# Patient Record
Sex: FEMALE | Race: WHITE
[De-identification: names, ages, dates, MRNs, and addresses within clinical notes are randomized per-mention and may not be internally consistent; named-entity substitution may affect disease eponyms.]

---

## 2020-03-03 ENCOUNTER — HOSPITAL ENCOUNTER (OUTPATIENT)
Dept: HOSPITAL 47 - PROCWHC3 | Age: 75
Discharge: HOME | End: 2020-03-03
Attending: FAMILY MEDICINE
Payer: MEDICARE

## 2020-03-03 VITALS
RESPIRATION RATE: 16 BRPM | TEMPERATURE: 97.5 F | SYSTOLIC BLOOD PRESSURE: 149 MMHG | HEART RATE: 55 BPM | DIASTOLIC BLOOD PRESSURE: 80 MMHG

## 2020-03-03 DIAGNOSIS — M81.0: Primary | ICD-10-CM

## 2020-03-03 PROCEDURE — 96365 THER/PROPH/DIAG IV INF INIT: CPT

## 2020-08-27 ENCOUNTER — APPOINTMENT (OUTPATIENT)
Dept: URBAN - METROPOLITAN AREA CLINIC 234 | Age: 75
Setting detail: DERMATOLOGY
End: 2020-08-27

## 2020-08-27 VITALS — WEIGHT: 136 LBS | HEIGHT: 66 IN

## 2020-08-27 DIAGNOSIS — D485 NEOPLASM OF UNCERTAIN BEHAVIOR OF SKIN: ICD-10-CM

## 2020-08-27 DIAGNOSIS — L57.8 OTHER SKIN CHANGES DUE TO CHRONIC EXPOSURE TO NONIONIZING RADIATION: ICD-10-CM

## 2020-08-27 PROBLEM — D48.5 NEOPLASM OF UNCERTAIN BEHAVIOR OF SKIN: Status: ACTIVE | Noted: 2020-08-27

## 2020-08-27 PROCEDURE — 99201: CPT | Mod: 25

## 2020-08-27 PROCEDURE — 11102 TANGNTL BX SKIN SINGLE LES: CPT

## 2020-08-27 PROCEDURE — OTHER BIOPSY BY SHAVE METHOD: OTHER

## 2020-08-27 PROCEDURE — OTHER COUNSELING: OTHER

## 2020-08-27 PROCEDURE — A4550 SURGICAL TRAYS: HCPCS

## 2020-08-27 ASSESSMENT — LOCATION SIMPLE DESCRIPTION DERM
LOCATION SIMPLE: RIGHT UPPER ARM
LOCATION SIMPLE: RIGHT POSTERIOR UPPER ARM
LOCATION SIMPLE: LEFT UPPER BACK

## 2020-08-27 ASSESSMENT — LOCATION DETAILED DESCRIPTION DERM
LOCATION DETAILED: LEFT MID-UPPER BACK
LOCATION DETAILED: RIGHT ANTERIOR DISTAL UPPER ARM
LOCATION DETAILED: RIGHT PROXIMAL POSTERIOR UPPER ARM

## 2020-08-27 ASSESSMENT — LOCATION ZONE DERM
LOCATION ZONE: TRUNK
LOCATION ZONE: ARM

## 2020-08-27 NOTE — PROCEDURE: BIOPSY BY SHAVE METHOD
Bill For Surgical Tray: yes
Hide Topical Anesthesia?: No
Anesthesia Type: 1% lidocaine with epinephrine and a 1:10 solution of 8.4% sodium bicarbonate
Silver Nitrate Text: The wound bed was treated with silver nitrate after the biopsy was performed.
Notification Instructions: Patient will be notified of biopsy results. However, patient instructed to call the office if not contacted within 2 weeks.
Type Of Destruction Used: Curettage
Additional Anesthesia Volume In Cc (Will Not Render If 0): 0
Wound Care: Petrolatum
Biopsy Type: H and E
Size Of Lesion In Cm: 0.9
Electrodesiccation Text: The wound bed was treated with electrodesiccation after the biopsy was performed.
Information: Selecting Yes will display possible errors in your note based on the variables you have selected. This validation is only offered as a suggestion for you. PLEASE NOTE THAT THE VALIDATION TEXT WILL BE REMOVED WHEN YOU FINALIZE YOUR NOTE. IF YOU WANT TO FAX A PRELIMINARY NOTE YOU WILL NEED TO TOGGLE THIS TO 'NO' IF YOU DO NOT WANT IT IN YOUR FAXED NOTE.
Cryotherapy Text: The wound bed was treated with cryotherapy after the biopsy was performed.
Body Location Override (Optional - Billing Will Still Be Based On Selected Body Map Location If Applicable): left back
Hemostasis: Meggan's
Consent: Written consent was obtained and risks were reviewed including but not limited to scarring, infection, bleeding, scabbing, incomplete removal, nerve damage and allergy to anesthesia.
Biopsy Method: double edge Personna blade
Depth Of Biopsy: dermis
Electrodesiccation And Curettage Text: The wound bed was treated with electrodesiccation and curettage after the biopsy was performed.
Post-Care Instructions: I reviewed with the patient in detail post-care instructions. Patient is to keep the biopsy site dry overnight, and then apply bacitracin twice daily until healed. Patient may apply hydrogen peroxide soaks to remove any crusting.
Detail Level: Detailed
Billing Type: Third-Party Bill
Anesthesia Volume In Cc (Will Not Render If 0): 0.5
Curettage Text: The wound bed was treated with curettage after the biopsy was performed.
Dressing: bandage

## 2021-03-19 ENCOUNTER — HOSPITAL ENCOUNTER (OUTPATIENT)
Dept: HOSPITAL 47 - PROCWHC3 | Age: 76
End: 2021-03-19
Attending: FAMILY MEDICINE
Payer: MEDICARE

## 2021-03-19 VITALS
TEMPERATURE: 97.6 F | RESPIRATION RATE: 16 BRPM | HEART RATE: 52 BPM | SYSTOLIC BLOOD PRESSURE: 131 MMHG | DIASTOLIC BLOOD PRESSURE: 74 MMHG

## 2021-03-19 DIAGNOSIS — M81.0: Primary | ICD-10-CM

## 2021-03-19 PROCEDURE — 96365 THER/PROPH/DIAG IV INF INIT: CPT

## 2021-12-14 ENCOUNTER — HOSPITAL ENCOUNTER (OUTPATIENT)
Dept: HOSPITAL 47 - ORWHC2ENDO | Age: 76
Discharge: HOME | End: 2021-12-14
Attending: SURGERY
Payer: MEDICARE

## 2021-12-14 VITALS — RESPIRATION RATE: 18 BRPM | HEART RATE: 78 BPM | SYSTOLIC BLOOD PRESSURE: 117 MMHG | DIASTOLIC BLOOD PRESSURE: 56 MMHG

## 2021-12-14 VITALS — TEMPERATURE: 97.3 F

## 2021-12-14 VITALS — BODY MASS INDEX: 21.6 KG/M2

## 2021-12-14 DIAGNOSIS — M81.0: ICD-10-CM

## 2021-12-14 DIAGNOSIS — Z98.890: ICD-10-CM

## 2021-12-14 DIAGNOSIS — M19.90: ICD-10-CM

## 2021-12-14 DIAGNOSIS — Z86.010: ICD-10-CM

## 2021-12-14 DIAGNOSIS — Z96.652: ICD-10-CM

## 2021-12-14 DIAGNOSIS — Z79.82: ICD-10-CM

## 2021-12-14 DIAGNOSIS — Z80.8: ICD-10-CM

## 2021-12-14 DIAGNOSIS — Z79.899: ICD-10-CM

## 2021-12-14 DIAGNOSIS — I25.10: ICD-10-CM

## 2021-12-14 DIAGNOSIS — I25.2: ICD-10-CM

## 2021-12-14 DIAGNOSIS — D64.9: ICD-10-CM

## 2021-12-14 DIAGNOSIS — I10: ICD-10-CM

## 2021-12-14 DIAGNOSIS — Z12.11: Primary | ICD-10-CM

## 2021-12-14 DIAGNOSIS — E78.5: ICD-10-CM

## 2021-12-14 DIAGNOSIS — Z95.5: ICD-10-CM

## 2021-12-14 DIAGNOSIS — Z77.22: ICD-10-CM

## 2021-12-14 DIAGNOSIS — Z82.49: ICD-10-CM

## 2021-12-14 DIAGNOSIS — Z85.3: ICD-10-CM

## 2021-12-14 DIAGNOSIS — Z87.891: ICD-10-CM

## 2021-12-14 PROCEDURE — 45378 DIAGNOSTIC COLONOSCOPY: CPT

## 2021-12-23 ENCOUNTER — APPOINTMENT (OUTPATIENT)
Dept: URBAN - METROPOLITAN AREA CLINIC 234 | Age: 76
Setting detail: DERMATOLOGY
End: 2021-12-23

## 2021-12-23 VITALS — HEIGHT: 66 IN | WEIGHT: 138 LBS

## 2021-12-23 DIAGNOSIS — L82.1 OTHER SEBORRHEIC KERATOSIS: ICD-10-CM

## 2021-12-23 DIAGNOSIS — L57.8 OTHER SKIN CHANGES DUE TO CHRONIC EXPOSURE TO NONIONIZING RADIATION: ICD-10-CM

## 2021-12-23 DIAGNOSIS — D18.0 HEMANGIOMA: ICD-10-CM

## 2021-12-23 PROBLEM — D18.01 HEMANGIOMA OF SKIN AND SUBCUTANEOUS TISSUE: Status: ACTIVE | Noted: 2021-12-23

## 2021-12-23 PROCEDURE — OTHER COUNSELING: OTHER

## 2021-12-23 PROCEDURE — 99213 OFFICE O/P EST LOW 20 MIN: CPT

## 2021-12-23 ASSESSMENT — LOCATION DETAILED DESCRIPTION DERM
LOCATION DETAILED: LEFT ANTERIOR PROXIMAL THIGH
LOCATION DETAILED: PERIUMBILICAL SKIN
LOCATION DETAILED: RIGHT PROXIMAL DORSAL FOREARM
LOCATION DETAILED: LEFT PROXIMAL DORSAL FOREARM
LOCATION DETAILED: RIGHT MEDIAL UPPER BACK

## 2021-12-23 ASSESSMENT — LOCATION SIMPLE DESCRIPTION DERM
LOCATION SIMPLE: LEFT FOREARM
LOCATION SIMPLE: ABDOMEN
LOCATION SIMPLE: LEFT THIGH
LOCATION SIMPLE: RIGHT UPPER BACK
LOCATION SIMPLE: RIGHT FOREARM

## 2021-12-23 ASSESSMENT — LOCATION ZONE DERM
LOCATION ZONE: TRUNK
LOCATION ZONE: LEG
LOCATION ZONE: ARM

## 2022-06-17 ENCOUNTER — HOSPITAL ENCOUNTER (INPATIENT)
Dept: HOSPITAL 47 - EC | Age: 77
LOS: 5 days | Discharge: NURSING, REHAB OR LONG-TERM CARE | DRG: 482 | End: 2022-06-22
Attending: ORTHOPAEDIC SURGERY | Admitting: ORTHOPAEDIC SURGERY
Payer: MEDICARE

## 2022-06-17 DIAGNOSIS — I45.10: ICD-10-CM

## 2022-06-17 DIAGNOSIS — I25.10: ICD-10-CM

## 2022-06-17 DIAGNOSIS — Z79.82: ICD-10-CM

## 2022-06-17 DIAGNOSIS — S72.21XA: Primary | ICD-10-CM

## 2022-06-17 DIAGNOSIS — I44.0: ICD-10-CM

## 2022-06-17 DIAGNOSIS — Z96.652: ICD-10-CM

## 2022-06-17 DIAGNOSIS — Z82.3: ICD-10-CM
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
## 2022-06-17 DIAGNOSIS — I11.9: ICD-10-CM

## 2022-06-17 DIAGNOSIS — D72.829: ICD-10-CM

## 2022-06-17 DIAGNOSIS — Z79.899: ICD-10-CM

## 2022-06-17 DIAGNOSIS — M81.0: ICD-10-CM

## 2022-06-17 DIAGNOSIS — Z82.49: ICD-10-CM

## 2022-06-17 DIAGNOSIS — E78.5: ICD-10-CM

## 2022-06-17 DIAGNOSIS — Z85.3: ICD-10-CM

## 2022-06-17 DIAGNOSIS — Z80.8: ICD-10-CM

## 2022-06-17 DIAGNOSIS — Z86.74: ICD-10-CM

## 2022-06-17 DIAGNOSIS — Z08: ICD-10-CM

## 2022-06-17 DIAGNOSIS — I25.2: ICD-10-CM
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
## 2022-06-17 DIAGNOSIS — Z95.5: ICD-10-CM

## 2022-06-17 LAB
ALBUMIN SERPL-MCNC: 4.1 G/DL (ref 3.5–5)
ALP SERPL-CCNC: 87 U/L (ref 38–126)
ALT SERPL-CCNC: 29 U/L (ref 4–34)
ANION GAP SERPL CALC-SCNC: 6 MMOL/L
APTT BLD: 20.9 SEC (ref 22–30)
AST SERPL-CCNC: 46 U/L (ref 14–36)
BASOPHILS # BLD AUTO: 0.1 K/UL (ref 0–0.2)
BASOPHILS NFR BLD AUTO: 0 %
BUN SERPL-SCNC: 19 MG/DL (ref 7–17)
CALCIUM SPEC-MCNC: 8.7 MG/DL (ref 8.4–10.2)
CHLORIDE SERPL-SCNC: 105 MMOL/L (ref 98–107)
CO2 SERPL-SCNC: 24 MMOL/L (ref 22–30)
EOSINOPHIL # BLD AUTO: 0.2 K/UL (ref 0–0.7)
EOSINOPHIL NFR BLD AUTO: 1 %
ERYTHROCYTE [DISTWIDTH] IN BLOOD BY AUTOMATED COUNT: 3.88 M/UL (ref 3.8–5.4)
ERYTHROCYTE [DISTWIDTH] IN BLOOD: 11.7 % (ref 11.5–15.5)
GLUCOSE SERPL-MCNC: 158 MG/DL (ref 74–99)
HCT VFR BLD AUTO: 38.6 % (ref 34–46)
HGB BLD-MCNC: 13.1 GM/DL (ref 11.4–16)
INR PPP: 1 (ref ?–1.2)
LYMPHOCYTES # SPEC AUTO: 1.4 K/UL (ref 1–4.8)
LYMPHOCYTES NFR SPEC AUTO: 11 %
MCH RBC QN AUTO: 33.9 PG (ref 25–35)
MCHC RBC AUTO-ENTMCNC: 34 G/DL (ref 31–37)
MCV RBC AUTO: 99.7 FL (ref 80–100)
MONOCYTES # BLD AUTO: 0.6 K/UL (ref 0–1)
MONOCYTES NFR BLD AUTO: 4 %
NEUTROPHILS # BLD AUTO: 10.6 K/UL (ref 1.3–7.7)
NEUTROPHILS NFR BLD AUTO: 82 %
PH UR: 7.5 [PH] (ref 5–8)
PLATELET # BLD AUTO: 266 K/UL (ref 150–450)
POTASSIUM SERPL-SCNC: 3.9 MMOL/L (ref 3.5–5.1)
PROT SERPL-MCNC: 6.7 G/DL (ref 6.3–8.2)
PT BLD: 10.7 SEC (ref 9–12)
SODIUM SERPL-SCNC: 135 MMOL/L (ref 137–145)
SP GR UR: 1.01 (ref 1–1.03)
UROBILINOGEN UR QL STRIP: <2 MG/DL (ref ?–2)
WBC # BLD AUTO: 13 K/UL (ref 3.8–10.6)

## 2022-06-17 PROCEDURE — 80320 DRUG SCREEN QUANTALCOHOLS: CPT

## 2022-06-17 PROCEDURE — 87635 SARS-COV-2 COVID-19 AMP PRB: CPT

## 2022-06-17 PROCEDURE — 36415 COLL VENOUS BLD VENIPUNCTURE: CPT

## 2022-06-17 PROCEDURE — 80306 DRUG TEST PRSMV INSTRMNT: CPT

## 2022-06-17 PROCEDURE — 85610 PROTHROMBIN TIME: CPT

## 2022-06-17 PROCEDURE — 93306 TTE W/DOPPLER COMPLETE: CPT

## 2022-06-17 PROCEDURE — 96374 THER/PROPH/DIAG INJ IV PUSH: CPT

## 2022-06-17 PROCEDURE — 73502 X-RAY EXAM HIP UNI 2-3 VIEWS: CPT

## 2022-06-17 PROCEDURE — 73501 X-RAY EXAM HIP UNI 1 VIEW: CPT

## 2022-06-17 PROCEDURE — 71045 X-RAY EXAM CHEST 1 VIEW: CPT

## 2022-06-17 PROCEDURE — 81003 URINALYSIS AUTO W/O SCOPE: CPT

## 2022-06-17 PROCEDURE — 86850 RBC ANTIBODY SCREEN: CPT

## 2022-06-17 PROCEDURE — 85025 COMPLETE CBC W/AUTO DIFF WBC: CPT

## 2022-06-17 PROCEDURE — 86900 BLOOD TYPING SEROLOGIC ABO: CPT

## 2022-06-17 PROCEDURE — 80048 BASIC METABOLIC PNL TOTAL CA: CPT

## 2022-06-17 PROCEDURE — 96376 TX/PRO/DX INJ SAME DRUG ADON: CPT

## 2022-06-17 PROCEDURE — 86901 BLOOD TYPING SEROLOGIC RH(D): CPT

## 2022-06-17 PROCEDURE — 80053 COMPREHEN METABOLIC PANEL: CPT

## 2022-06-17 PROCEDURE — 99285 EMERGENCY DEPT VISIT HI MDM: CPT

## 2022-06-17 PROCEDURE — 85730 THROMBOPLASTIN TIME PARTIAL: CPT

## 2022-06-17 RX ADMIN — HYDROMORPHONE HYDROCHLORIDE PRN MG: 1 INJECTION, SOLUTION INTRAMUSCULAR; INTRAVENOUS; SUBCUTANEOUS at 18:03

## 2022-06-17 RX ADMIN — CEFAZOLIN SCH: 330 INJECTION, POWDER, FOR SOLUTION INTRAMUSCULAR; INTRAVENOUS at 22:50

## 2022-06-17 RX ADMIN — HYDROMORPHONE HYDROCHLORIDE PRN MG: 1 INJECTION, SOLUTION INTRAMUSCULAR; INTRAVENOUS; SUBCUTANEOUS at 20:58

## 2022-06-17 RX ADMIN — CHOLECALCIFEROL TAB 125 MCG (5000 UNIT) SCH MCG: 125 TAB at 22:51

## 2022-06-17 RX ADMIN — METOPROLOL SUCCINATE SCH MG: 25 TABLET, EXTENDED RELEASE ORAL at 20:58

## 2022-06-17 RX ADMIN — ATORVASTATIN CALCIUM SCH MG: 40 TABLET, FILM COATED ORAL at 20:58

## 2022-06-17 RX ADMIN — CYCLOBENZAPRINE HYDROCHLORIDE SCH MG: 10 TABLET, FILM COATED ORAL at 16:29

## 2022-06-17 RX ADMIN — CYCLOBENZAPRINE HYDROCHLORIDE SCH MG: 10 TABLET, FILM COATED ORAL at 22:51

## 2022-06-17 NOTE — ED
General Adult HPI





- General


Chief complaint: Fall


Stated complaint: Femur fracture


Time Seen by Provider: 06/17/22 15:16


Source: patient, RN notes reviewed, old records reviewed


Mode of arrival: EMS


Limitations: no limitations





- History of Present Illness


Initial comments: 





Patient is a 77-year-old female with past medical history remarkable for 

hypertension, hyperlipidemia, alcohol use with no history of withdrawals who 

presents emergency Department following a fall at home.  Patient states she was 

cleaning out her basement when she fell and landed on her right side on the 

concrete floor.  Instantly had pain in her thigh.  Wasn't able to ambulate.  EMS

is concerned regarding off femur fracture.  Patient has intact neurovascularly 

in the right lower extremity.  Denies any other injuries.  Denies any head 

injury, loss of consciousness, being on blood thinners.  Denies any cervical, 

thoracic, lumbar spine tenderness to palpation.  His no abdominal or chest pain.

 No other acute complaints at this time.  States she it was mechanical fall, 

losing her balance and falling.  Denies syncopal episodes or lightheadedness 

prior to fall.  His no other acute complaints at this time.





- Related Data


                                Home Medications











 Medication  Instructions  Recorded  Confirmed


 


Melatonin 10 mg PO HS 12/09/14 03/21/22


 


Turmeric 475 mg PO DAILY 12/09/14 03/21/22


 


Aspirin [Adult Low Dose Aspirin EC] 81 mg PO DAILY 03/03/20 03/21/22


 


Losartan Potassium 100 mg PO DAILY 03/03/20 03/21/22


 


Metoprolol Succinate (ER) [Toprol 25 mg PO 2100 03/03/20 03/21/22





XL]   


 


hydroCHLOROthiazide 25 mg PO DAILY 03/03/20 03/21/22


 


Atorvastatin [Lipitor] 40 mg PO 2100 12/10/21 03/21/22


 


Calcium/Magnesium/Zinc 2 each PO DAILY 12/10/21 03/21/22





[Calcium-Magnesium-Zinc Tablet]   


 


Cholecalciferol [Vitamin D3 (125 125 mcg PO DAILY 12/10/21 03/21/22





Mcg = 5000 Iu)]   


 


Reclast Infusion 1 applicate IV AS DIRECTED 12/10/21 03/21/22


 


amLODIPine [Norvasc] 5 mg PO 2100 12/10/21 03/21/22











                                    Allergies











Allergy/AdvReac Type Severity Reaction Status Date / Time


 


No Known Allergies Allergy   Verified 03/21/22 12:07














Review of Systems


ROS Statement: 


Those systems with pertinent positive or pertinent negative responses have been 

documented in the HPI.


Review of Systems:


CONST: Denies fever 


EYES: Denies blurry vision 


ENT: Denies nasal congestion  


C/V:  Denies Chest pain


RESP: Denies shortness of breath 


GI: Denies abdominal pain 


: Denies dysuria  


SKIN: Denies rash.


MSK: Endorses right thigh pain.


NEURO: Denies headache 


ROS Other: All systems not noted in ROS Statement are negative.





Past Medical History


Past Medical History: Coronary Artery Disease (CAD), Cancer, Hyperlipidemia, 

Hypertension, Myocardial Infarction (MI), Osteoarthritis (OA)


Additional Past Medical History / Comment(s): BREAST CANCER, OSTEOPOROSIS, hx 

anemia


Last Myocardial Infarction Date:: 08/25/2019


History of Any Multi-Drug Resistant Organisms: None Reported


Past Surgical History: Breast Surgery, Heart Catheterization With Stent, 

Orthopedic Surgery


Additional Past Surgical History / Comment(s): LT BREAST LUMPECTOMY, LT KNEE 

arthroscopy, 2 cardiac stents, left knee replacement


Past Anesthesia/Blood Transfusion Reactions: Previous Problems w/ Anesthesia, 

Motion Sickness, Postoperative Nausea & Vomiting (PONV)


Additional Past Anesthesia/Blood Transfusion Reaction / Comment(s): HAD SEVERE 

PAIN AND BRUISING ON LEFT LOWER BACK AREA AFTER LT KNEE SCOPE


Date of Last Stent Placement:: 08/25/2019


Past Psychological History: No Psychological Hx Reported


Smoking Status: Never smoker


Past Alcohol Use History: Daily


Past Drug Use History: None Reported





- Past Family History


  ** Father


Family Medical History: Myocardial Infarction (MI)





  ** Mother


Family Medical History: CVA/TIA





  ** Daughter(s)


Additional Family Medical History / Comment(s): melanoma





General Exam





- General Exam Comments


Initial Comments: 





General: Appears in moderate distress secondary to right leg pain.


HEAD:  Normal with no signs of head trauma.


EYES:  PERRLA, EOMI, conjunctiva normal, no discharge.  Pupils are 3 mm and 

equal bilaterally.


ENT:  Hearing grossly intact, normal oropharynx.


RESPIRATORY:  Clear breath sounds bilaterally.  No wheezes, rales, or rhonchi.  


C/V:  Regular rate and rhythm. S1 and S2 auscultated, no edema, peripheral p

ulses 2+ and intact throughout


ABD:  Abd is soft, nontender, nondistended


EXT: Reduced range of motion of the right lower extremity with somewhat obvious 

deformity to the right thigh.  Right lower extremity is shortened.  Minimally 

externally rotated.  Neurovascularly intact in the right lower extremity.  

Pelvis is stable.  No midline C, T spine, L spine tenderness to palpation.


SKIN: Patient has resolving poison ivy located over the  bilateral arms.


NEURO: Alert and oriented 4.  No focal sensory strength deficits other than 

right lower extremity secondary to suspected fracture.  No sensory deficits 

distal to the fracture site.


Limitations: no limitations





Course





                                   Vital Signs











  06/17/22





  15:16


 


Temperature 97.3 F L


 


Pulse Rate 61


 


Respiratory 16





Rate 


 


Blood Pressure 158/84


 


O2 Sat by Pulse 98





Oximetry 














Medical Decision Making





- Medical Decision Making





Based on the patient's presentation and physical exam, I'm concerned for a right

 femur fracture.  She is no other signs of injury.  Therefore we will obtain 

basic surgical labs, x-rays of the right femur, pelvis and hip, as well as 

chest.  She was in agreement this plan.  Vital signs otherwise within normal 

limits and stable.  She is neurovascular intact distal to the fracture site.  It

 is not a open fracture.  Patient will be Mr. IV fluids as well as Dilaudid for 

pain.





Laboratory studies are remarkable for mild leukocytosis of 13.  Opiate screen is

 positive after she received the Dilaudid.  Alcohol level is negative.  

Remainder the labs are unremarkable.  Chest x-ray, hip x-ray, pelvis x-ray, 

femur x-rays revealed a comminuted oblique fracture of the femur proximal shaft 

and subtrochanteric region.  There is also displaced fracture fragment of the 

lesser trochanteric region.





After the patient.  Blood catheter was placed.  Patient's leg is stabilized, and

 remains neurovascularly intact distally, with good DP and TP pulses.  I spoke 

with the admitting doctor, Evaristo in orthopedic surgery who requested the 

patient be made nothing by mouth after midnight.  Flexeril and pain medications 

will be continued.  He requested medicine be consulted for medical management.  

We discussed reduction verses casting versus immobilizer, and he states that as 

long as the leg is stable with intact neurovascular exam, it can remain as it 

is.  Recommended the patient remained nonambulatory.  I discussed this with the 

patient she was in agreement this plan.  I consulted Dr. De who agreed to 

medically manage the patient.  She was restarted on her home medications.  Plan 

is surgery tomorrow.  NPO after midnight. She was admitted in stable condition.





- Lab Data


Result diagrams: 


                                 06/17/22 15:59





                                 06/17/22 15:59





                                   Lab Results











  06/17/22 06/17/22 06/17/22 Range/Units





  15:59 15:59 15:59 


 


WBC  13.0 H    (3.8-10.6)  k/uL


 


RBC  3.88    (3.80-5.40)  m/uL


 


Hgb  13.1    (11.4-16.0)  gm/dL


 


Hct  38.6    (34.0-46.0)  %


 


MCV  99.7    (80.0-100.0)  fL


 


MCH  33.9    (25.0-35.0)  pg


 


MCHC  34.0    (31.0-37.0)  g/dL


 


RDW  11.7    (11.5-15.5)  %


 


Plt Count  266    (150-450)  k/uL


 


MPV  7.5    


 


Neutrophils %  82    %


 


Lymphocytes %  11    %


 


Monocytes %  4    %


 


Eosinophils %  1    %


 


Basophils %  0    %


 


Neutrophils #  10.6 H    (1.3-7.7)  k/uL


 


Lymphocytes #  1.4    (1.0-4.8)  k/uL


 


Monocytes #  0.6    (0-1.0)  k/uL


 


Eosinophils #  0.2    (0-0.7)  k/uL


 


Basophils #  0.1    (0-0.2)  k/uL


 


PT   10.7   (9.0-12.0)  sec


 


INR   1.0   (<1.2)  


 


APTT   20.9 L   (22.0-30.0)  sec


 


Sodium    135 L  (137-145)  mmol/L


 


Potassium    3.9  (3.5-5.1)  mmol/L


 


Chloride    105  ()  mmol/L


 


Carbon Dioxide    24  (22-30)  mmol/L


 


Anion Gap    6  mmol/L


 


BUN    19 H  (7-17)  mg/dL


 


Creatinine    0.74  (0.52-1.04)  mg/dL


 


Est GFR (CKD-EPI)AfAm    >90  (>60 ml/min/1.73 sqM)  


 


Est GFR (CKD-EPI)NonAf    79  (>60 ml/min/1.73 sqM)  


 


Glucose    158 H  (74-99)  mg/dL


 


Calcium    8.7  (8.4-10.2)  mg/dL


 


Total Bilirubin    0.8  (0.2-1.3)  mg/dL


 


AST    46 H  (14-36)  U/L


 


ALT    29  (4-34)  U/L


 


Alkaline Phosphatase    87  ()  U/L


 


Total Protein    6.7  (6.3-8.2)  g/dL


 


Albumin    4.1  (3.5-5.0)  g/dL


 


Urine Color     


 


Urine Appearance     (Clear)  


 


Urine pH     (5.0-8.0)  


 


Ur Specific Gravity     (1.001-1.035)  


 


Urine Protein     (Negative)  


 


Urine Glucose (UA)     (Negative)  


 


Urine Ketones     (Negative)  


 


Urine Blood     (Negative)  


 


Urine Nitrite     (Negative)  


 


Urine Bilirubin     (Negative)  


 


Urine Urobilinogen     (<2.0)  mg/dL


 


Ur Leukocyte Esterase     (Negative)  


 


Urine Opiates Screen     (NotDetected)  


 


Ur Oxycodone Screen     (NotDetected)  


 


Urine Methadone Screen     (NotDetected)  


 


Ur Propoxyphene Screen     (NotDetected)  


 


Ur Barbiturates Screen     (NotDetected)  


 


U Tricyclic Antidepress     (NotDetected)  


 


Ur Phencyclidine Scrn     (NotDetected)  


 


Ur Amphetamines Screen     (NotDetected)  


 


U Methamphetamines Scrn     (NotDetected)  


 


U Benzodiazepines Scrn     (NotDetected)  


 


Urine Cocaine Screen     (NotDetected)  


 


U Marijuana (THC) Screen     (NotDetected)  


 


Serum Alcohol    <10  mg/dL


 


Blood Type     


 


Blood Type Recheck     


 


Bld Type Recheck Status     


 


Antibody Screen     


 


Spec Expiration Date     














  06/17/22 06/17/22 Range/Units





  15:59 16:29 


 


WBC    (3.8-10.6)  k/uL


 


RBC    (3.80-5.40)  m/uL


 


Hgb    (11.4-16.0)  gm/dL


 


Hct    (34.0-46.0)  %


 


MCV    (80.0-100.0)  fL


 


MCH    (25.0-35.0)  pg


 


MCHC    (31.0-37.0)  g/dL


 


RDW    (11.5-15.5)  %


 


Plt Count    (150-450)  k/uL


 


MPV    


 


Neutrophils %    %


 


Lymphocytes %    %


 


Monocytes %    %


 


Eosinophils %    %


 


Basophils %    %


 


Neutrophils #    (1.3-7.7)  k/uL


 


Lymphocytes #    (1.0-4.8)  k/uL


 


Monocytes #    (0-1.0)  k/uL


 


Eosinophils #    (0-0.7)  k/uL


 


Basophils #    (0-0.2)  k/uL


 


PT    (9.0-12.0)  sec


 


INR    (<1.2)  


 


APTT    (22.0-30.0)  sec


 


Sodium    (137-145)  mmol/L


 


Potassium    (3.5-5.1)  mmol/L


 


Chloride    ()  mmol/L


 


Carbon Dioxide    (22-30)  mmol/L


 


Anion Gap    mmol/L


 


BUN    (7-17)  mg/dL


 


Creatinine    (0.52-1.04)  mg/dL


 


Est GFR (CKD-EPI)AfAm    (>60 ml/min/1.73 sqM)  


 


Est GFR (CKD-EPI)NonAf    (>60 ml/min/1.73 sqM)  


 


Glucose    (74-99)  mg/dL


 


Calcium    (8.4-10.2)  mg/dL


 


Total Bilirubin    (0.2-1.3)  mg/dL


 


AST    (14-36)  U/L


 


ALT    (4-34)  U/L


 


Alkaline Phosphatase    ()  U/L


 


Total Protein    (6.3-8.2)  g/dL


 


Albumin    (3.5-5.0)  g/dL


 


Urine Color   Yellow  


 


Urine Appearance   Clear  (Clear)  


 


Urine pH   7.5  (5.0-8.0)  


 


Ur Specific Gravity   1.015  (1.001-1.035)  


 


Urine Protein   Negative  (Negative)  


 


Urine Glucose (UA)   Negative  (Negative)  


 


Urine Ketones   Negative  (Negative)  


 


Urine Blood   Negative  (Negative)  


 


Urine Nitrite   Negative  (Negative)  


 


Urine Bilirubin   Negative  (Negative)  


 


Urine Urobilinogen   <2.0  (<2.0)  mg/dL


 


Ur Leukocyte Esterase   Negative  (Negative)  


 


Urine Opiates Screen   Detected H  (NotDetected)  


 


Ur Oxycodone Screen   Not Detected  (NotDetected)  


 


Urine Methadone Screen   Not Detected  (NotDetected)  


 


Ur Propoxyphene Screen   Not Detected  (NotDetected)  


 


Ur Barbiturates Screen   Not Detected  (NotDetected)  


 


U Tricyclic Antidepress   Not Detected  (NotDetected)  


 


Ur Phencyclidine Scrn   Not Detected  (NotDetected)  


 


Ur Amphetamines Screen   Not Detected  (NotDetected)  


 


U Methamphetamines Scrn   Not Detected  (NotDetected)  


 


U Benzodiazepines Scrn   Not Detected  (NotDetected)  


 


Urine Cocaine Screen   Not Detected  (NotDetected)  


 


U Marijuana (THC) Screen   Not Detected  (NotDetected)  


 


Serum Alcohol    mg/dL


 


Blood Type  O Positive   


 


Blood Type Recheck  No Previous Record   


 


Bld Type Recheck Status  CABO Indicated   


 


Antibody Screen  NEGATIVE   


 


Spec Expiration Date  06/20/2022 - 2359   














Disposition


Clinical Impression: 


 Fall, Femur fracture, right





Disposition: ADMITTED AS IP TO THIS HOSP


Condition: Stable


Referrals: 


Paris Ramirez MD [Primary Care Provider] - 1-2 days


Time of Disposition: 16:18

## 2022-06-17 NOTE — XR
EXAMINATION TYPE: XR chest 1V portable

 

DATE OF EXAM: 6/17/2022

 

Comparison: None

 

Clinical History: 77-year-old female pain after falling, trauma

 

Findings:

Heart mildly enlarged. Mild atherosclerotic arch calcifications. Mild interstitial prominence has a c
hronic appearance. Surgical clips left axilla. No marcel consolidation or pleural effusion.

 

 

Impression:

Mild cardiomegaly. Chronic appearing changes in the lungs. No definite acute process.

## 2022-06-17 NOTE — P.PN
Progress Note - Text


Progress Note Date: 06/17/22





Spoke with ED about pt.  Imaging reviewed.  She will be admitted to Orthopedic 

service with medicine consult for clearance.  We plan on OR tomorrow AM for ORIF

with IMN fixation.

## 2022-06-17 NOTE — XR
EXAMINATION TYPE: XR 2 views Hip RT and AP Pelvis, XR femur 2 views RT

 

DATE OF EXAM: 6/17/2022

 

COMPARISON: NONE

 

HISTORY: 77-year-old female trauma, pain following

 

 

FINDINGS: 

 

Pelvis and right hip:

SI joints appear symmetric and intact. Pubic symphysis appears intact. Left hip appears intact. Minim
al marginal spurring at both hips. Degenerative changes lower lumbar spine.

 

Right femur:

There is a comminuted fracture of the proximal shaft and subtrochanteric region of the right femur. T
here is a displaced fracture fragment comprised of the lesser trochanter and a 14.1 cm long butterfly
 fragment. There is one shaft's width of medial displacement along with posterior apex angulation. Th
ere is degenerative spurring is noted at the knee.

 

 

 

IMPRESSION:  

1. Right femur: Comminuted oblique fracture proximal shaft and subtrochanteric region of the right fe
mur. Displaced fracture fragment is comprised of the lesser trochanter and there is also a 14.1 cm lo
ng butterfly fragment. One shaft's width of medial displacement along with posterior apex angulation.


2. Pelvis and right hip: Minimal early degenerative spurring of both hips. Degenerative changes lower
 lumbar spine.

## 2022-06-18 LAB
ANION GAP SERPL CALC-SCNC: 10.2 MMOL/L (ref 10–18)
BASOPHILS # BLD AUTO: 0.03 X 10*3/UL (ref 0–0.1)
BASOPHILS NFR BLD AUTO: 0.4 %
BUN SERPL-SCNC: 14.9 MG/DL (ref 9–27)
BUN/CREAT SERPL: 21.29 RATIO (ref 12–20)
CALCIUM SPEC-MCNC: 8 MG/DL (ref 8.7–10.3)
CHLORIDE SERPL-SCNC: 104 MMOL/L (ref 96–109)
CO2 SERPL-SCNC: 22.8 MMOL/L (ref 20–27.5)
EOSINOPHIL # BLD AUTO: 0.08 X 10*3/UL (ref 0.04–0.35)
EOSINOPHIL NFR BLD AUTO: 1.1 %
ERYTHROCYTE [DISTWIDTH] IN BLOOD BY AUTOMATED COUNT: 3.3 X 10*6/UL (ref 4.1–5.2)
ERYTHROCYTE [DISTWIDTH] IN BLOOD: 11.9 % (ref 11.5–14.5)
GLUCOSE SERPL-MCNC: 141 MG/DL (ref 70–110)
HCT VFR BLD AUTO: 32.6 % (ref 37.2–46.3)
HGB BLD-MCNC: 11 G/DL (ref 12–15)
IMM GRANULOCYTES BLD QL AUTO: 0.1 %
LYMPHOCYTES # SPEC AUTO: 1.65 X 10*3/UL (ref 0.9–5)
LYMPHOCYTES NFR SPEC AUTO: 22.5 %
MCH RBC QN AUTO: 33.3 PG (ref 27–32)
MCHC RBC AUTO-ENTMCNC: 33.7 G/DL (ref 32–37)
MCV RBC AUTO: 98.8 FL (ref 80–97)
MONOCYTES # BLD AUTO: 0.85 X 10*3/UL (ref 0.2–1)
MONOCYTES NFR BLD AUTO: 11.6 %
NEUTROPHILS # BLD AUTO: 4.71 X 10*3/UL (ref 1.8–7.7)
NEUTROPHILS NFR BLD AUTO: 64.3 %
NRBC BLD AUTO-RTO: 0 /100 WBCS (ref 0–0)
PLATELET # BLD AUTO: 217 X 10*3/UL (ref 140–440)
POTASSIUM SERPL-SCNC: 3.5 MMOL/L (ref 3.5–5.5)
SODIUM SERPL-SCNC: 137 MMOL/L (ref 135–145)
WBC # BLD AUTO: 7.33 X 10*3/UL (ref 4.5–10)

## 2022-06-18 RX ADMIN — CYCLOBENZAPRINE HYDROCHLORIDE SCH MG: 10 TABLET, FILM COATED ORAL at 21:40

## 2022-06-18 RX ADMIN — CHOLECALCIFEROL TAB 125 MCG (5000 UNIT) SCH MCG: 125 TAB at 22:05

## 2022-06-18 RX ADMIN — HYDROMORPHONE HYDROCHLORIDE PRN MG: 1 INJECTION, SOLUTION INTRAMUSCULAR; INTRAVENOUS; SUBCUTANEOUS at 00:18

## 2022-06-18 RX ADMIN — HYDROMORPHONE HYDROCHLORIDE PRN MG: 1 INJECTION, SOLUTION INTRAMUSCULAR; INTRAVENOUS; SUBCUTANEOUS at 18:06

## 2022-06-18 RX ADMIN — LOSARTAN POTASSIUM SCH MG: 50 TABLET, FILM COATED ORAL at 08:21

## 2022-06-18 RX ADMIN — Medication PRN MG: at 21:40

## 2022-06-18 RX ADMIN — ATORVASTATIN CALCIUM SCH MG: 40 TABLET, FILM COATED ORAL at 22:05

## 2022-06-18 RX ADMIN — HYDROMORPHONE HYDROCHLORIDE PRN MG: 1 INJECTION, SOLUTION INTRAMUSCULAR; INTRAVENOUS; SUBCUTANEOUS at 13:29

## 2022-06-18 RX ADMIN — CEFAZOLIN SCH MLS/HR: 330 INJECTION, POWDER, FOR SOLUTION INTRAMUSCULAR; INTRAVENOUS at 07:45

## 2022-06-18 RX ADMIN — HYDROMORPHONE HYDROCHLORIDE PRN MG: 1 INJECTION, SOLUTION INTRAMUSCULAR; INTRAVENOUS; SUBCUTANEOUS at 21:40

## 2022-06-18 RX ADMIN — CEFAZOLIN SCH MLS/HR: 330 INJECTION, POWDER, FOR SOLUTION INTRAMUSCULAR; INTRAVENOUS at 16:42

## 2022-06-18 RX ADMIN — ASPIRIN 81 MG CHEWABLE TABLET SCH MG: 81 TABLET CHEWABLE at 08:22

## 2022-06-18 RX ADMIN — HYDROMORPHONE HYDROCHLORIDE PRN MG: 1 INJECTION, SOLUTION INTRAMUSCULAR; INTRAVENOUS; SUBCUTANEOUS at 05:16

## 2022-06-18 RX ADMIN — CYCLOBENZAPRINE HYDROCHLORIDE SCH MG: 10 TABLET, FILM COATED ORAL at 08:22

## 2022-06-18 RX ADMIN — HYDROMORPHONE HYDROCHLORIDE PRN MG: 1 INJECTION, SOLUTION INTRAMUSCULAR; INTRAVENOUS; SUBCUTANEOUS at 08:20

## 2022-06-18 RX ADMIN — METOPROLOL SUCCINATE SCH MG: 25 TABLET, EXTENDED RELEASE ORAL at 22:05

## 2022-06-18 NOTE — P.CONS
History of Present Illness





- Reason for Consult


Consult date: 06/18/22





- History of Present Illness








Amanda Pruitt,  is a 77-year-old female who presented to [facility] emergency room after sustaining a fall at home, patient stated that she

fell in her basement on the concrete floor and was having pain in the right 

thigh area she was unable to ambulate, she denies any other area of pain , she 

denies any head trauma when she fell there was no loss of consciousness , EMS 

were called and she was brought into emergency room.


She was evaluated in the emergency room vital examination on presentation 

revealed a temperature of 97.3 pulse 61 respirations 16 blood pressure 158/84 

pulse ox 98% on room air


Laboratory data revealed a white blood count of 13.0 hemoglobin 13.1 platelet 

count 266 BUN 19 creatinine 0.74 urine analysis did not reveal any evidence of 

urinary tract infection


Testing in the emergency room revealed x-ray of the right femur revealed 

comminuted oblique fracture in the proximal shaft and subtrochanteric region of 

the right femur, chest x-ray revealed marked cardiomegaly no acute process.  EKG

was done in the emergency room and revealed sinus rhythm with first-degree AV 

block and right bundle branch block and T-wave abnormality in the inferior leads


Patient was admitted to medical floor for further evaluation and treatment.


Past medical history is significant for history of hypertension, history of 

hyperlipidemia, history of coronary artery disease with history of myocardial 

infarction with cardiac arrest in 2019, patient had angioplasty with 2 stent 

placement at that time in [place], she states that she has been following with 

cardiology since then and had no further cardiac issues.  Past medical history 

is also significant for history of breast cancer, with history of left breast 

lumpectomy, and history of osteoarthritis with history of left total knee 

arthroplasty.








Past Medical History


Past Medical History: Coronary Artery Disease (CAD), Cancer, Hyperlipidemia, 

Hypertension, Myocardial Infarction (MI), Osteoarthritis (OA)


Additional Past Medical History / Comment(s): BREAST CANCER, OSTEOPOROSIS, hx 

anemia, cardiac arrest in 2019 with MI


Last Myocardial Infarction Date:: 08/25/2019


History of Any Multi-Drug Resistant Organisms: None Reported


Past Surgical History: Breast Surgery, Heart Catheterization With Stent, 

Orthopedic Surgery


Additional Past Surgical History / Comment(s): LT BREAST LUMPECTOMY, LT KNEE 

arthroscopy, 2 cardiac stents, left knee replacement


Past Anesthesia/Blood Transfusion Reactions: Previous Problems w/ Anesthesia, 

Motion Sickness, Postoperative Nausea & Vomiting (PONV)


Additional Past Anesthesia/Blood Transfusion Reaction / Comm: HAD SEVERE PAIN 

AND BRUISING ON LEFT LOWER BACK AREA AFTER LT KNEE SCOPE


Date of Last Stent Placement:: 08/25/2019


Past Psychological History: No Psychological Hx Reported


Smoking Status: Never smoker


Past Alcohol Use History: Daily


Additional Past Alcohol Use History / Comment(s): SMOKED VERY LITTLE IN 20'S, 

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 small glass red 

wine daily


Past Drug Use History: None Reported





- Past Family History


  ** Father


Family Medical History: Myocardial Infarction (MI)





  ** Mother


Family Medical History: CVA/TIA





  ** Daughter(s)


Additional Family Medical History / Comment(s): melanoma





Medications and Allergies


                                Home Medications











 Medication  Instructions  Recorded  Confirmed  Type


 


Melatonin 10 mg PO HS PRN 12/09/14 06/17/22 History


 


Aspirin [Adult Low Dose Aspirin EC] 81 mg PO DAILY 03/03/20 06/17/22 History


 


Losartan Potassium 100 mg PO DAILY 03/03/20 06/17/22 History


 


Metoprolol Succinate (ER) [Toprol 25 mg PO HS 03/03/20 06/17/22 History





XL]    


 


hydroCHLOROthiazide 25 mg PO DAILY 03/03/20 06/17/22 History


 


Atorvastatin [Lipitor] 40 mg PO HS 12/10/21 06/17/22 History


 


Calcium/Magnesium/Zinc 2 tab PO DAILY 12/10/21 06/17/22 History





[Calcium-Magnesium-Zinc Tablet]    


 


Cholecalciferol [Vitamin D3 (125 125 mcg PO HS 12/10/21 06/17/22 History





Mcg = 5000 Iu)]    


 


amLODIPine [Norvasc] 5 mg PO HS 12/10/21 06/17/22 History


 


Turmeric Root Extract [Turmeric] 500 mg PO DAILY 06/17/22 06/17/22 History


 


diphenhydrAMINE [Benadryl] 50 mg PO Q6H PRN 06/17/22 06/17/22 History








                                    Allergies











Allergy/AdvReac Type Severity Reaction Status Date / Time


 


No Known Allergies Allergy   Verified 06/17/22 17:22














Physical Exam


Vitals: 


                                   Vital Signs











  Temp Pulse Pulse Resp BP BP Pulse Ox


 


 06/18/22 07:52  98.0 F   68  20   147/60  92 L


 


 06/18/22 02:43  98.8 F   66  16   127/66  93 L


 


 06/17/22 21:09  98.1 F   59 L  16   125/88  94 L


 


 06/17/22 18:09  97.7 F  59 L   18  121/43   95


 


 06/17/22 15:16  97.3 F L  61   16  158/84   98








                                Intake and Output











 06/17/22 06/18/22 06/18/22





 22:59 06:59 14:59


 


Intake Total  700 


 


Output Total  900 


 


Balance  -200 


 


Intake:   


 


  Intake, IV Titration  700 





  Amount   


 


    Sodium Chloride 0.9% 1,  700 





    000 ml @ 100 mls/hr IV .   





    Q10H [facility] Rx#:[id number]   


 


Output:   


 


  Urine  900 


 


Other:   


 


  Voiding Method Indwelling Catheter  


 


  Weight 63.503 kg  

















In general patient is alert and oriented x 3 in no distress


HEENT head normocephalic and atraumatic


Neck is supple no JVD no goiter no lymphadenopathy no carotid bruit


Chest examination is clear to auscultation no crackles no wheezing


Cardiac exam reveals regular heart sounds S1 and S2 no gallops no murmurs


Abdomen is soft nontender no organomegaly with normal bowel sounds


Extremity exam reveals no edema no cyanosis or clubbing


Neurological examination reveals no gross focal deficits





Results


CBC & Chem 7: 


                                 06/18/22 05:21





                                 06/18/22 05:21


Labs: 


                  Abnormal Lab Results - Last 24 Hours (Table)











  06/17/22 06/17/22 06/17/22 Range/Units





  15:59 15:59 15:59 


 


WBC  13.0 H    (3.8-10.6)  k/uL


 


RBC     (4.10-5.20)  X 10*6/uL


 


Hgb     (12.0-15.0)  g/dL


 


Hct     (37.2-46.3)  %


 


MCV     (80.0-97.0)  fL


 


MCH     (27.0-32.0)  pg


 


Neutrophils #  10.6 H    (1.3-7.7)  k/uL


 


APTT   20.9 L   (22.0-30.0)  sec


 


Sodium    135 L  (137-145)  mmol/L


 


BUN    19 H  (7-17)  mg/dL


 


BUN/Creatinine Ratio     (12.00-20.00)  Ratio


 


Glucose    158 H  (74-99)  mg/dL


 


Calcium     (8.7-10.3)  mg/dL


 


AST    46 H  (14-36)  U/L


 


Urine Opiates Screen     (NotDetected)  














  06/17/22 06/18/22 06/18/22 Range/Units





  16:29 05:21 05:21 


 


WBC     (3.8-10.6)  k/uL


 


RBC   3.30 L   (4.10-5.20)  X 10*6/uL


 


Hgb   11.0 L   (12.0-15.0)  g/dL


 


Hct   32.6 L   (37.2-46.3)  %


 


MCV   98.8 H   (80.0-97.0)  fL


 


MCH   33.3 H   (27.0-32.0)  pg


 


Neutrophils #     (1.3-7.7)  k/uL


 


APTT     (22.0-30.0)  sec


 


Sodium     (137-145)  mmol/L


 


BUN     (7-17)  mg/dL


 


BUN/Creatinine Ratio    21.29 H  (12.00-20.00)  Ratio


 


Glucose    141 H  (74-99)  mg/dL


 


Calcium    8.0 L  (8.7-10.3)  mg/dL


 


AST     (14-36)  U/L


 


Urine Opiates Screen  Detected H    (NotDetected)  














Assessment and Plan


Plan: 








Fall with right femur fracture





Underlying history of hypertension





Underlying history of hyperlipidemia





Underlying history of coronary artery disease with history of coronary artery 

angioplasty and stent placement 2 in 2019





Previous history of breast cancer with left lumpectomy








At this time patient was admitted to medical floor


Home medications reviewed and reordered


EKG and chest x-ray reviewed no acute abnormality


Echocardiogram ordered and cardiology consult for clearance requested due to 

history of coronary artery disease


Recheck labs in a.m. Will follow closely

## 2022-06-18 NOTE — P.CRDCN
History of Present Illness


History of present illness: 


77-year-old lady with history of coronary artery disease status post prior 

angioplasty in the setting of an acute myocardial infarction presented to 

Hospital having had a fall in her basement and fracturing the right femur.  I 

have been consulted for preop cardiac evaluation.  She does not have angina 

symptoms of heart failure.  She has been fairly active up until now.  She 

apparently had cardiac evaluation by cardiologist from [place] in April of this 

year.  In EKG shows sinus rhythm with right bundle branch block.  Her hemoglobin

is 11.  Potassium is 3.5.  Creatinine is 0.7.  At home she was on a beta blocker

that should be continued and was on losartan for blood pressure along with 

Norvasc she is an aspirin and Lipitor.  We will continue these medications.  I 

will review the echocardiogram that was done this morning to evaluate his LV 

function.


Patient is an acceptable risk for surgery under anesthesia.  She will continue 

current medications perioperatively.





Review of systems:


14 out of 14 review of systems has been performed pertinent 7 documented in 

history of presenting illness





General:  The patient is awake and alert, in no distress, and does not appear 

acutely ill. 


Skin:  Skin is warm and dry and no rashes or lesions are noted. 


Eye:  Pupils are equal, round and reactive to light, extra-ocular movements are 

intact; there is normal conjunctiva bilaterally.  


Ears, nose, mouth and throat:  There are moist mucous membranes and no oral 

lesions. 


Neck:  The neck is supple, there is no tenderness  or JVD.  


Cardiovascular:  There is a regular rate and rhythm. No murmur, rub or gallop is

appreciated.


Respiratory:  Lungs are clear to auscultation, respirations are non-labored, 

breath sounds are equal.  


Gastrointestinal:  Soft, non-distended, non-tender abdomen without masses or 

organomegaly noted. There is no rebound or guarding present. Bowel sounds are 

unremarkable. 


Back:  There is no tenderness to palpation in the midline. There is no obvious 

deformity.


Musculoskeletal.  Patient has fracture of the right  


Extremities: No edema. 


Vascular: Femoral pulse is normal. Posterior tibial pulses are normal .Dorsalis 

pedis is palpable.


Neurological:  CN II-XII intact. There are no obvious motor or sensory deficits.

Speech is normal.


Psychiatric:  Cooperative, appropriate mood & affect, normal judgment.  





Assessment and plan:





Preop cardiac evaluation


Right hip fracture


CAD status post angioplasty


Hypertension


Dyslipidemia





I will check the echocardiogram


Patient is an acceptable risk candidate for surgery under anesthesia


Continue current medications perioperatively








Past Medical History


Past Medical History: Coronary Artery Disease (CAD), Cancer, Hyperlipidemia, 

Hypertension, Myocardial Infarction (MI), Osteoarthritis (OA)


Additional Past Medical History / Comment(s): BREAST CANCER, OSTEOPOROSIS, hx 

anemia, cardiac arrest in 2019 with MI


Last Myocardial Infarction Date:: 08/25/2019


History of Any Multi-Drug Resistant Organisms: None Reported


Past Surgical History: Breast Surgery, Heart Catheterization With Stent, 

Orthopedic Surgery


Additional Past Surgical History / Comment(s): LT BREAST LUMPECTOMY, LT KNEE 

arthroscopy, 2 cardiac stents, left knee replacement


Past Anesthesia/Blood Transfusion Reactions: Previous Problems w/ Anesthesia, 

Motion Sickness, Postoperative Nausea & Vomiting (PONV)


Additional Past Anesthesia/Blood Transfusion Reaction / Comment(s): HAD SEVERE 

PAIN AND BRUISING ON LEFT LOWER BACK AREA AFTER LT KNEE SCOPE


Date of Last Stent Placement:: 08/25/2019


Past Psychological History: No Psychological Hx Reported


Smoking Status: Never smoker


Past Alcohol Use History: Daily


Additional Past Alcohol Use History / Comment(s): SMOKED VERY LITTLE IN 20'S, 

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 small glass red 

wine daily


Past Drug Use History: None Reported





- Past Family History


  ** Father


Family Medical History: Myocardial Infarction (MI)





  ** Mother


Family Medical History: CVA/TIA





  ** Daughter(s)


Additional Family Medical History / Comment(s): melanoma





Medications and Allergies


                                Home Medications











 Medication  Instructions  Recorded  Confirmed  Type


 


Melatonin 10 mg PO HS PRN 12/09/14 06/17/22 History


 


Aspirin [Adult Low Dose Aspirin EC] 81 mg PO DAILY 03/03/20 06/17/22 History


 


Losartan Potassium 100 mg PO DAILY 03/03/20 06/17/22 History


 


Metoprolol Succinate (ER) [Toprol 25 mg PO HS 03/03/20 06/17/22 History





XL]    


 


hydroCHLOROthiazide 25 mg PO DAILY 03/03/20 06/17/22 History


 


Atorvastatin [Lipitor] 40 mg PO HS 12/10/21 06/17/22 History


 


Calcium/Magnesium/Zinc 2 tab PO DAILY 12/10/21 06/17/22 History





[Calcium-Magnesium-Zinc Tablet]    


 


Cholecalciferol [Vitamin D3 (125 125 mcg PO HS 12/10/21 06/17/22 History





Mcg = 5000 Iu)]    


 


amLODIPine [Norvasc] 5 mg PO HS 12/10/21 06/17/22 History


 


Turmeric Root Extract [Turmeric] 500 mg PO DAILY 06/17/22 06/17/22 History


 


diphenhydrAMINE [Benadryl] 50 mg PO Q6H PRN 06/17/22 06/17/22 History








                                    Allergies











Allergy/AdvReac Type Severity Reaction Status Date / Time


 


No Known Allergies Allergy   Verified 06/17/22 17:22














Physical Exam


Vitals: 


                                   Vital Signs











  Temp Pulse Pulse Resp BP BP Pulse Ox


 


 06/18/22 07:52  98.0 F   68  20   147/60  92 L


 


 06/18/22 02:43  98.8 F   66  16   127/66  93 L


 


 06/17/22 21:09  98.1 F   59 L  16   125/88  94 L


 


 06/17/22 18:09  97.7 F  59 L   18  121/43   95


 


 06/17/22 15:16  97.3 F L  61   16  158/84   98








                                Intake and Output











 06/17/22 06/18/22 06/18/22





 22:59 06:59 14:59


 


Intake Total  700 


 


Output Total  900 


 


Balance  -200 


 


Intake:   


 


  Intake, IV Titration  700 





  Amount   


 


    Sodium Chloride 0.9% 1,  700 





    000 ml @ 100 mls/hr IV .   





    Q10H [facility] Rx#:[id number]   


 


Output:   


 


  Urine  900 


 


Other:   


 


  Voiding Method Indwelling Catheter  


 


  Weight 63.503 kg  














Results





                                 06/18/22 05:21





                                 06/18/22 05:21


                                 Cardiac Enzymes











  06/17/22 Range/Units





  15:59 


 


AST  46 H  (14-36)  U/L








                                   Coagulation











  06/17/22 Range/Units





  15:59 


 


PT  10.7  (9.0-12.0)  sec


 


APTT  20.9 L  (22.0-30.0)  sec








                                       CBC











  06/17/22 06/18/22 Range/Units





  15:59 05:21 


 


WBC  13.0 H  7.33  (3.8-10.6)  k/uL


 


RBC  3.88  3.30 L  (3.80-5.40)  m/uL


 


Hgb  13.1  11.0 L  (11.4-16.0)  gm/dL


 


Hct  38.6  32.6 L  (34.0-46.0)  %


 


Plt Count  266  217  (150-450)  k/uL








                          Comprehensive Metabolic Panel











  06/17/22 06/18/22 Range/Units





  15:59 05:21 


 


Sodium  135 L  137  (137-145)  mmol/L


 


Potassium  3.9  3.5  (3.5-5.1)  mmol/L


 


Chloride  105  104  ()  mmol/L


 


Carbon Dioxide  24  22.8  (22-30)  mmol/L


 


BUN  19 H  14.9  (7-17)  mg/dL


 


Creatinine  0.74  0.7  (0.52-1.04)  mg/dL


 


Glucose  158 H  141 H  (74-99)  mg/dL


 


Calcium  8.7  8.0 L  (8.4-10.2)  mg/dL


 


AST  46 H   (14-36)  U/L


 


ALT  29   (4-34)  U/L


 


Alkaline Phosphatase  87   ()  U/L


 


Total Protein  6.7   (6.3-8.2)  g/dL


 


Albumin  4.1   (3.5-5.0)  g/dL








                               Current Medications











Generic Name Dose Route Start Last Admin





  Trade Name Freq  PRN Reason Stop Dose Admin


 


Amlodipine Besylate  5 mg  06/17/22 21:00  06/17/22 20:58





  Amlodipine 5 Mg Tab  PO   5 mg





  HS BAM   Administration


 


Aspirin  81 mg  06/18/22 09:00  06/18/22 08:22





  Aspirin 81 Mg  PO   81 mg





  DAILY BAM   Administration


 


Atorvastatin Calcium  40 mg  06/17/22 21:00  06/17/22 20:58





  Atorvastatin 40 Mg Tab  PO   40 mg





  HS BAM   Administration


 


Cholecalciferol  125 mcg  06/17/22 21:15  06/17/22 22:51





  Cholecalciferol 125 Mcg (5000 Iu) Tablet  PO   125 mcg





  HS BAM   Administration


 


Cyclobenzaprine HCl  10 mg  06/17/22 16:16  06/18/22 08:22





  Cyclobenzaprine 10 Mg Tab  PO   10 mg





  BID BAM   Administration


 


Diphenhydramine HCl  50 mg  06/17/22 17:39  06/17/22 19:03





  Diphenhydramine 25 Mg Cap  PO   50 mg





  Q6H PRN   Administration





  poison ivy itching  


 


Hydrochlorothiazide  25 mg  06/18/22 09:00  06/18/22 08:22





  Hydrochlorothiazide 25 Mg Tab  PO   25 mg





  DAILY BAM   Administration


 


Hydrocortisone  1 applic  06/17/22 17:40  06/18/22 08:32





  Hydrocortisone 1% Cream 30 Gm Tube  TOPICAL   1 applic





  BID PRN   Administration





  Skin Irritation  





  Protocol  


 


Hydromorphone HCl  0.5 mg  06/17/22 17:07  06/18/22 08:20





  Hydromorphone 0.5 Mg/0.5 Ml Syringe  IVP   0.5 mg





  Q3HR PRN   Administration





  Pain  


 


Sodium Chloride  1,000 mls @ 100 mls/hr  06/17/22 16:30  06/17/22 22:50





  Saline 0.9%  IV   Not Given





  .Q10H BAM  


 


Losartan Potassium  100 mg  06/18/22 09:00  06/18/22 08:21





  Losartan 50 Mg Tab  PO   100 mg





  DAILY BAM   Administration


 


Melatonin  10 mg  06/17/22 21:05 





  Melatonin 5 Mg Tablet  PO  





  HS PRN  





  sleep  


 


Metoprolol Succinate  25 mg  06/17/22 21:00  06/17/22 20:58





  Metoprolol Succinate (Er) 25 Mg Tab.Er.24h  PO   25 mg





  HS BAM   Administration


 


Naloxone HCl  0.2 mg  06/17/22 16:16 





  Naloxone 0.4 Mg/Ml 1 Ml Vial  IV  





  Q2M PRN  





  Opioid Reversal  


 


Ondansetron HCl  4 mg  06/17/22 16:16 





  Ondansetron 4 Mg/2 Ml Vial  IVP  





  Q8HR PRN  





  Nausea And Vomiting  








                                Intake and Output











 06/17/22 06/18/22 06/18/22





 22:59 06:59 14:59


 


Intake Total  700 


 


Output Total  900 


 


Balance  -200 


 


Intake:   


 


  Intake, IV Titration  700 





  Amount   


 


    Sodium Chloride 0.9% 1,  700 





    000 ml @ 100 mls/hr IV .   





    Q10H [facility] Rx#:[id number]   


 


Output:   


 


  Urine  900 


 


Other:   


 


  Voiding Method Indwelling Catheter  


 


  Weight 63.503 kg  








                                        





                                 06/18/22 05:21 





                                 06/18/22 05:21

## 2022-06-18 NOTE — P.HPOR
History of Present Illness


H&P Date: 22


Chief Complaint: FFS right hip fracture





78 yo female was at home going into her basement when she tripped over a pipe 

and fell hard onto her right hip on concrete.  She had immediate pain and 

inability to ambulate after the fall.  She was brought to ED via EMS. She c/o 

pain in her right hip and buttock region as well as groin region.  She states no

radiation down her leg.  She states she can feel her leg.  She states some pain 

in her left chest area as well.  She has a hx of cardiac arrest back in 2018 

after MI for which she was treated.  She is otherwise active, plays softball and

lives alone.  She ambulates independently.  She states no other injures at this 

time.  Denies BHT or LOC with the fall.  





Review of Systems





14 points review of systems completed and as stated in HPI, all other systems 

reviewed are negative. 


Constitutional: Reports as per HPI





Past Medical History


Past Medical History: Coronary Artery Disease (CAD), Cancer, Hyperlipidemia, 

Hypertension, Myocardial Infarction (MI), Osteoarthritis (OA)


Additional Past Medical History / Comment(s): BREAST CANCER, OSTEOPOROSIS, hx 

anemia, cardiac arrest in 2019 with MI


Last Myocardial Infarction Date:: 2019


History of Any Multi-Drug Resistant Organisms: None Reported


Past Surgical History: Breast Surgery, Heart Catheterization With Stent, 

Orthopedic Surgery


Additional Past Surgical History / Comment(s): LT BREAST LUMPECTOMY, LT KNEE 

arthroscopy, 2 cardiac stents, left knee replacement


Past Anesthesia/Blood Transfusion Reactions: Previous Problems w/ Anesthesia, 

Motion Sickness, Postoperative Nausea & Vomiting (PONV)


Additional Past Anesthesia/Blood Transfusion Reaction / Comment(s): HAD SEVERE 

PAIN AND BRUISING ON LEFT LOWER BACK AREA AFTER LT KNEE SCOPE


Date of Last Stent Placement:: 2019


Past Psychological History: No Psychological Hx Reported


Smoking Status: Never smoker


Past Alcohol Use History: Daily


Additional Past Alcohol Use History / Comment(s): SMOKED VERY LITTLE IN 20'S, 

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 small glass red 

wine daily


Past Drug Use History: None Reported





- Past Family History


  ** Father


Family Medical History: Myocardial Infarction (MI)





  ** Mother


Family Medical History: CVA/TIA





  ** Daughter(s)


Additional Family Medical History / Comment(s): melanoma





Medications and Allergies


                                Home Medications











 Medication  Instructions  Recorded  Confirmed  Type


 


Melatonin 10 mg PO HS PRN 14 History


 


Aspirin [Adult Low Dose Aspirin EC] 81 mg PO DAILY 20 History


 


Losartan Potassium 100 mg PO DAILY 20 History


 


Metoprolol Succinate (ER) [Toprol 25 mg PO HS 20 History





XL]    


 


hydroCHLOROthiazide 25 mg PO DAILY 20 History


 


Atorvastatin [Lipitor] 40 mg PO HS 12/10/21 06/17/22 History


 


Calcium/Magnesium/Zinc 2 tab PO DAILY 12/10/21 06/17/22 History





[Calcium-Magnesium-Zinc Tablet]    


 


Cholecalciferol [Vitamin D3 (125 125 mcg PO HS 12/10/21 06/17/22 History





Mcg = 5000 Iu)]    


 


amLODIPine [Norvasc] 5 mg PO HS 12/10/21 06/17/22 History


 


Turmeric Root Extract [Turmeric] 500 mg PO DAILY 22 History


 


diphenhydrAMINE [Benadryl] 50 mg PO Q6H PRN 22 History








                                    Allergies











Allergy/AdvReac Type Severity Reaction Status Date / Time


 


No Known Allergies Allergy   Verified 22 17:22














Physical Examination


Osteopathic Statement: *.  No significant issues noted on an osteopathic 

structural exam other than those noted in the History and Physical/Consult.





AOX3 NAD


Non septic appearing


TTP about the right hip


TTP about the left chest and ribs


FROM UE b/l w/o pain all joints


FROM LLE all joints w/o pain





Painful log roll R hip


Contusive changes right hip


No open wounds


5/5 DF/PF/EHL/FHL b/l LE


2/4 dp/PT pulses b/l


SILT L2-S1


Compartments soft and compressive, right thigh swollen but compressive





Normal respirations and excursion


CNII-XII grossly intact


No pathological reflexes


No TTP of CTL spine


FROM neck w/o pain


PERRL





Results





Imaging of the Right hip and pelvis demonstrates a Right subtrochanteric 

fracture that is 3 part, displaced, rotated and shortened.  Pelvis is stable and

 congruent w/o fracture.  FA joints stable w/o fracture.  No evidence of 

fracture in the right knee or visualized portions of the distal right femur or 

leg.  





- Labs


Labs: 


                  Abnormal Lab Results - Last 24 Hours (Table)











  22 Range/Units





  15:59 15:59 15:59 


 


WBC  13.0 H    (3.8-10.6)  k/uL


 


Neutrophils #  10.6 H    (1.3-7.7)  k/uL


 


APTT   20.9 L   (22.0-30.0)  sec


 


Sodium    135 L  (137-145)  mmol/L


 


BUN    19 H  (7-17)  mg/dL


 


Glucose    158 H  (74-99)  mg/dL


 


AST    46 H  (14-36)  U/L


 


Urine Opiates Screen     (NotDetected)  














  22 Range/Units





  16:29 


 


WBC   (3.8-10.6)  k/uL


 


Neutrophils #   (1.3-7.7)  k/uL


 


APTT   (22.0-30.0)  sec


 


Sodium   (137-145)  mmol/L


 


BUN   (7-17)  mg/dL


 


Glucose   (74-99)  mg/dL


 


AST   (14-36)  U/L


 


Urine Opiates Screen  Detected H  (NotDetected)  








                                      H & H











  22 Range/Units





  15:59 


 


Hgb  13.1  (11.4-16.0)  gm/dL


 


Hct  38.6  (34.0-46.0)  %








                                   Coagulation











  22 Range/Units





  15:59 


 


INR  1.0  (<1.2)  











Result Diagrams: 


                                 22 15:59





                                 22 15:59





Assessment and Plan


Assessment: 





78 yo female s/p FFS





Right subtrochanteric hip fracture, displaced, rotated, comminuted, closed





Hx MI with CA








Plan: 


-NPO at MN





-cardiac and medical clearance still pending for pt to proceed.  This will be 

done today per anesth in preparation for surgical intervention 22.  





-Pain control





-Flexeril





-NWB





-ABX on call to OR


                                        


                                        


                         Orthopedic Surgery Risk Review





                                         





Amanda Pruitt is a 78 yo female presenting for evaluation of sudden onset RIG

HT HIP pain, inability to ambulate after fall from standing at home on concrete.

   It was my pleasure to have seen and examined Amanda Pruitt.  





In our visit today we have had a chance to go over subjective complaints, 

physical examination findings and treatments including the natural course 

history without intervention and various interventional options. Her imaging 

demonstrates right hip subtrochanteric fracture with displacement and rotation 

combination. On physical exam, Amanda Pruitt demonstrates pain with motion of

 right hip and right lower extremity, which is NV intact at this time. 





I have explained to the patient that this fracture needs stabilization. Based on

 the patients imaging, physical exam, and the rapid progression and disabling 

nature of her symptoms, at this time I recommend surgery in the form or a: Right

 hip open reduction internal fixation with intramedullary nail fixation I 

discussed the risk and benefits of this procedure at length with Amanda Pruitt and her family who was at bedside.   Questions were invited and 

answered, and the patient wishes to proceed as outlined below.   





Currently, I am recommendin.  Right hip open reduction internal fixation with intramedullary nail fixation





2.    Review of surgical risks and benefits as well as an educational packet on 

the proposed surgical procedure. 





  





Risks:  





All surgical procedures come with inherent risks, including those related to 

positioning, anesthesia, intraoperative findings, and postoperative 

complications.  It is important to understand that surgery does not come with 

any guarantee of a successful outcome as complications and adverse events are 

always possible.    





The patient was given a handout discussing the surgical procedure and risks 

associated with the intervention, both of which were discussed with the patient.

  These risks include but are not limited to the following: 





-      Experiencing same, different or even worse symptoms compared to before 

surgery. 





-      Requiring further surgery or other forms of treatment presently or at 

some time in the future . 





-      On an extreme but fortunately relatively rare basis severe complication 

such as blindness, stroke, heart attack, temporary and/or permanent nerve 

injury, paralysis, coma, or death may occur, sometimes without known 

explanation. 





-      Surgical complications may include but are not limited to risk of 

infection, fluid accumulation in the surgical dissection site, including a 

seroma or hematoma, that requires additional surgery, wound drainage, bleeding, 

new numbness or weakness, vision changes/loss, spinal fluid leakage, non-healing

 and/or infected incision, headaches, difficulty or inability to swallow, 

hoarseness, hemopneumothorax, pneumothorax,  injury to nerves, spinal cord, 

blood vessels, lymphatics or other vital organs (i.e., bowel injury, injury to 

the great vessels); heterotopic bone formation; complications related to the ha

rdware such as screws, rods,  including misplaced hardware, device failure, 

hardware fracture/breakage, or hardware loosening;  retained surgical 

instrumentations or devices and the need for further surgery.     





-      Medical risks of the planned surgery include but are not limited to 

generalized Infections to the whole body or local areas outside of the surgical 

site (sepsis), heart attack, bleeding, anaphylaxis, meningitis, seizure, 

epilepsy, hearing loss, burn marks, laceration of the head or other areas of the

 body, bruising, hypersensitivity of the skin, bladder over distension; allergic

 reaction; shoulder injury related to positioning; fat, blood and air clots to 

other areas of the body like heart, lungs, brain; failure of internal organs 

such as lungs, kidneys, liver and excessive bleeding. If blood transfusions are 

necessary, note that transfusions may cause intolerance reactions such as 

anaphylaxis or other complex reactions. 





  Despite best efforts, the results of surgery might not heal in terms of bone, 

soft tissues such as skin, fascia, ligaments, and joints.   





[facility] has multiple operating rooms with single and overlapping 

rooms running daily.  They currently function under the required guidelines as 

produced by the [employer] with regards to the overlapping rooms 

and will continue to comply with changes to this policy as they occur.  The 

requirements include and are complied with as follows: (1) the critical portions

 of the overlapping rooms will not occur at the same time, (2) the attending 

physician will be physically present during the critical portions of the 

procedure and immediately available during the entire case, and (3) a back-up 

attending is designated should the primary attending not be immediately 

available. 





The patient has had a chance to review all the listed information, has been 

given print outs detailing this information, and has had all his/her questions 

answered to their satisfaction. 





It was my pleasure to have seen and examined Amanda Pruitt. In our visit 

today we have had a chance to go over my understanding of our patient's current 

condition, the natural course history without intervention and various 

interventional options. Questions were invited and answered, and the patient 

wishes to proceed as outlined above. I have seen and examined the patient for 25

 minutes and we have spent more than 50% of the time in repeat and detailed 

counseling about the patient's condition, its natural course history with out 

and as much as can be predicted with surgery and re-review of various surgical 

treatment options. 





  





In conclusion, Amanda Pruitt and her family requested we proceed with the 

above suggested surgery and are willing to accept risks and limitations of the 

suggested surgery as nature of the disease process and our best attempts at 

treatment for the condition. 





Thank you again for allowing us to be part of your patient's care. Please don't 

hesitate to contact me if you have any further questions.    





  





Signed and authenticated by:       





  





[facility] 





Complex and Minimally Invasive Spine Surgery 





[address] [facility], [address] [address] 





[phone]  





Fax: [phone]

## 2022-06-18 NOTE — CA
Transthoracic Echo Report 

 Name: Amanda Pruitt 

 MRN:    [id number] 

 Age:    77     Gender:     F 

 

 :    1945 

 Exam Date:     2022 07:48 

 Exam Location: [facility] 

 Ht (in):     66     Wt (lb):     140 

 Ordering Physician:        Adriana Carroll 

 Attending/Referring Phys: 

 Technician         Afua Mckeon RDCS 

 Procedure CPT: 

 Indications:       pre-op 

 

 Cardiac Hx: 

 Technical Quality:      Fair 

 Contrast 1:                                Total Dose (mL): 

 Contrast 2:                                Total Dose (mL): 

 

 MEASUREMENTS  (Male / Female) Normal Values 

 2D ECHO 

 LV Diastolic Diameter PLAX        3.5 cm                4.2 - 5.9 / 3.9 - 5.3 cm 

 LV Systolic Diameter PLAX         2.3 cm                 

 IVS Diastolic Thickness           1.3 cm                0.6 - 1.0 / 0.6 - 0.9 cm 

 LVPW Diastolic Thickness          1.3 cm                0.6 - 1.0 / 0.6 - 0.9 cm 

 LV Relative Wall Thickness        0.8                    

 RV Internal Dim ED PLAX           3.2 cm                 

 LA Volume                         54.5 cm???              18 - 58 / 22 - 52 cm??? 

 

 M-MODE 

 Aortic Root Diameter MM           3.2 cm                 

 LA Systolic Diameter MM           3.0 cm                 

 LA Ao Ratio MM                    0.9                    

 AV Cusp Separation MM             1.9 cm                 

 

 DOPPLER 

 AV Peak Velocity                  180.7 cm/s             

 AV Peak Gradient                  13.1 mmHg              

 LVOT Peak Velocity                103.0 cm/s             

 LVOT Peak Gradient                4.2 mmHg               

 MV Area PHT                       4.2 cm???                

 Mitral E Point Velocity           134.1 cm/s             

 Mitral A Point Velocity           124.4 cm/s             

 Mitral E to A Ratio               1.1                    

 MV Deceleration Time              182.3 ms               

 MV E' Velocity                    11.3 cm/s              

 Mitral E to MV E' Ratio           11.8                   

 TR Peak Velocity                  291.4 cm/s             

 TR Peak Gradient                  34.0 mmHg              

 Right Ventricular Systolic Press  38.8 mmHg              

 

 

 FINDINGS 

 Left Ventricle 

 Moderately increased left ventricular wall thickness. Normal left ventricular  

 systolic function with no obvious regional wall motion abnormalities. Left  

 ventricular ejection fraction is estimated at 55-60 %. 

 

 Right Ventricle 

 Normal right ventricular size and function. Mild pulmonary hypertension. 

 

 Right Atrium 

 Normal right atrial size. 

 

 Left Atrium 

 Mildly increased left atrial volume. No evidence for an atrial septal defect. 

 

 Mitral Valve 

 Structurally normal mitral valve. Mild-to-moderate mitral regurgitation. 

 

 Aortic Valve 

 Aortic valve sclerosis. No aortic stenosis. No aortic regurgitation. 

 

 Tricuspid Valve 

 Mild tricuspid regurgitation. 

 

 Pulmonic Valve 

 Structurally normal pulmonic valve. Trace pulmonic regurgitation. 

 

 Pericardium 

 No pericardial effusion. 

 

 Aorta 

 Normal size aortic root and proximal ascending aorta. 

 

 CONCLUSIONS 

 Concentric left ventricular hypertrophy with normal LV systolic function 

 Mild to moderate mitral regurgitation 

 Mild tricuspid regurgitation 

 Previewed by:  

 Dr. Rodolfo Collins MD 

 (Electronically Signed) 

 Final Date:      2022 14:37

## 2022-06-19 LAB
ALBUMIN SERPL-MCNC: 3.2 G/DL (ref 3.8–4.9)
ALBUMIN/GLOB SERPL: 1.81 G/DL (ref 1.6–3.17)
ALP SERPL-CCNC: 63 U/L (ref 41–126)
ALT SERPL-CCNC: 22 U/L (ref 8–44)
ANION GAP SERPL CALC-SCNC: 8 MMOL/L
ANION GAP SERPL CALC-SCNC: 9.1 MMOL/L (ref 10–18)
AST SERPL-CCNC: 25 U/L (ref 13–35)
BASOPHILS # BLD AUTO: 0.04 X 10*3/UL (ref 0–0.1)
BASOPHILS # BLD AUTO: 0.1 K/UL (ref 0–0.2)
BASOPHILS NFR BLD AUTO: 0 %
BASOPHILS NFR BLD AUTO: 0.5 %
BUN SERPL-SCNC: 10 MG/DL (ref 7–17)
BUN SERPL-SCNC: 8.4 MG/DL (ref 9–27)
BUN/CREAT SERPL: 17.21 RATIO (ref 12–20)
CALCIUM SPEC-MCNC: 7.3 MG/DL (ref 8.4–10.2)
CALCIUM SPEC-MCNC: 7.6 MG/DL (ref 8.7–10.3)
CHLORIDE SERPL-SCNC: 102 MMOL/L (ref 98–107)
CHLORIDE SERPL-SCNC: 103 MMOL/L (ref 96–109)
CO2 SERPL-SCNC: 21 MMOL/L (ref 22–30)
CO2 SERPL-SCNC: 22.5 MMOL/L (ref 20–27.5)
EOSINOPHIL # BLD AUTO: 0.13 X 10*3/UL (ref 0.04–0.35)
EOSINOPHIL # BLD AUTO: 0.3 K/UL (ref 0–0.7)
EOSINOPHIL NFR BLD AUTO: 1.5 %
EOSINOPHIL NFR BLD AUTO: 2 %
ERYTHROCYTE [DISTWIDTH] IN BLOOD BY AUTOMATED COUNT: 2.95 X 10*6/UL (ref 4.1–5.2)
ERYTHROCYTE [DISTWIDTH] IN BLOOD BY AUTOMATED COUNT: 3.09 M/UL (ref 3.8–5.4)
ERYTHROCYTE [DISTWIDTH] IN BLOOD: 11.5 % (ref 11.5–15.5)
ERYTHROCYTE [DISTWIDTH] IN BLOOD: 11.7 % (ref 11.5–14.5)
GLOBULIN SER CALC-MCNC: 1.8 G/DL (ref 1.6–3.3)
GLUCOSE SERPL-MCNC: 123 MG/DL (ref 70–110)
GLUCOSE SERPL-MCNC: 158 MG/DL (ref 74–99)
HCT VFR BLD AUTO: 28.4 % (ref 37.2–46.3)
HCT VFR BLD AUTO: 30.6 % (ref 34–46)
HGB BLD-MCNC: 10.6 GM/DL (ref 11.4–16)
HGB BLD-MCNC: 9.6 G/DL (ref 12–15)
IMM GRANULOCYTES BLD QL AUTO: 0.6 %
LYMPHOCYTES # SPEC AUTO: 1.07 X 10*3/UL (ref 0.9–5)
LYMPHOCYTES # SPEC AUTO: 1.2 K/UL (ref 1–4.8)
LYMPHOCYTES NFR SPEC AUTO: 12.1 %
LYMPHOCYTES NFR SPEC AUTO: 6 %
MCH RBC QN AUTO: 32.5 PG (ref 27–32)
MCH RBC QN AUTO: 34.5 PG (ref 25–35)
MCHC RBC AUTO-ENTMCNC: 33.8 G/DL (ref 32–37)
MCHC RBC AUTO-ENTMCNC: 34.8 G/DL (ref 31–37)
MCV RBC AUTO: 96.3 FL (ref 80–97)
MCV RBC AUTO: 99.2 FL (ref 80–100)
MONOCYTES # BLD AUTO: 0.8 K/UL (ref 0–1)
MONOCYTES # BLD AUTO: 0.81 X 10*3/UL (ref 0.2–1)
MONOCYTES NFR BLD AUTO: 5 %
MONOCYTES NFR BLD AUTO: 9.1 %
NEUTROPHILS # BLD AUTO: 15.8 K/UL (ref 1.3–7.7)
NEUTROPHILS # BLD AUTO: 6.76 X 10*3/UL (ref 1.8–7.7)
NEUTROPHILS NFR BLD AUTO: 76.2 %
NEUTROPHILS NFR BLD AUTO: 87 %
NRBC BLD AUTO-RTO: 0 /100 WBCS (ref 0–0)
PLATELET # BLD AUTO: 153 K/UL (ref 150–450)
PLATELET # BLD AUTO: 169 X 10*3/UL (ref 140–440)
POTASSIUM SERPL-SCNC: 3 MMOL/L (ref 3.5–5.5)
POTASSIUM SERPL-SCNC: 3.4 MMOL/L (ref 3.5–5.1)
PROT SERPL-MCNC: 4.9 G/DL (ref 6.2–8.2)
SODIUM SERPL-SCNC: 131 MMOL/L (ref 137–145)
SODIUM SERPL-SCNC: 134 MMOL/L (ref 135–145)
WBC # BLD AUTO: 18.2 K/UL (ref 3.8–10.6)
WBC # BLD AUTO: 8.86 X 10*3/UL (ref 4.5–10)

## 2022-06-19 PROCEDURE — 0QS606Z REPOSITION RIGHT UPPER FEMUR WITH INTRAMEDULLARY INTERNAL FIXATION DEVICE, OPEN APPROACH: ICD-10-PCS

## 2022-06-19 RX ADMIN — CYCLOBENZAPRINE HYDROCHLORIDE SCH: 10 TABLET, FILM COATED ORAL at 15:05

## 2022-06-19 RX ADMIN — POTASSIUM CHLORIDE SCH MEQ: 20 TABLET, EXTENDED RELEASE ORAL at 14:40

## 2022-06-19 RX ADMIN — POTASSIUM CHLORIDE ONE MLS: 14.9 INJECTION, SOLUTION INTRAVENOUS at 10:57

## 2022-06-19 RX ADMIN — CEFAZOLIN SCH MLS/HR: 330 INJECTION, POWDER, FOR SOLUTION INTRAMUSCULAR; INTRAVENOUS at 14:40

## 2022-06-19 RX ADMIN — ATORVASTATIN CALCIUM SCH MG: 40 TABLET, FILM COATED ORAL at 22:09

## 2022-06-19 RX ADMIN — CHOLECALCIFEROL TAB 125 MCG (5000 UNIT) SCH MCG: 125 TAB at 22:08

## 2022-06-19 RX ADMIN — LOSARTAN POTASSIUM SCH MG: 50 TABLET, FILM COATED ORAL at 14:40

## 2022-06-19 RX ADMIN — HYDROMORPHONE HYDROCHLORIDE PRN MG: 1 INJECTION, SOLUTION INTRAMUSCULAR; INTRAVENOUS; SUBCUTANEOUS at 05:41

## 2022-06-19 RX ADMIN — HYDROCODONE BITARTRATE AND ACETAMINOPHEN PRN EACH: 5; 325 TABLET ORAL at 14:41

## 2022-06-19 RX ADMIN — CEFAZOLIN SCH MLS/HR: 330 INJECTION, POWDER, FOR SOLUTION INTRAMUSCULAR; INTRAVENOUS at 19:00

## 2022-06-19 RX ADMIN — ONDANSETRON PRN MG: 2 INJECTION INTRAMUSCULAR; INTRAVENOUS at 16:02

## 2022-06-19 RX ADMIN — METOPROLOL SUCCINATE SCH MG: 25 TABLET, EXTENDED RELEASE ORAL at 22:08

## 2022-06-19 RX ADMIN — POTASSIUM CHLORIDE SCH MEQ: 20 TABLET, EXTENDED RELEASE ORAL at 17:04

## 2022-06-19 RX ADMIN — ASPIRIN 81 MG CHEWABLE TABLET SCH MG: 81 TABLET CHEWABLE at 14:40

## 2022-06-19 RX ADMIN — CEFAZOLIN SCH: 330 INJECTION, POWDER, FOR SOLUTION INTRAMUSCULAR; INTRAVENOUS at 15:04

## 2022-06-19 RX ADMIN — DOCUSATE SODIUM AND SENNOSIDES SCH EACH: 50; 8.6 TABLET ORAL at 22:00

## 2022-06-19 RX ADMIN — CYCLOBENZAPRINE HYDROCHLORIDE SCH MG: 10 TABLET, FILM COATED ORAL at 22:08

## 2022-06-19 RX ADMIN — CYCLOBENZAPRINE HYDROCHLORIDE SCH MG: 10 TABLET, FILM COATED ORAL at 17:04

## 2022-06-19 RX ADMIN — POTASSIUM CHLORIDE ONE: 14.9 INJECTION, SOLUTION INTRAVENOUS at 14:18

## 2022-06-19 NOTE — PN
PROGRESS NOTE



FOLLOW-UP NOTE:

This patient is a 77-year-old lady who underwent right hip replacement for fracture of

the right femur.  She just underwent surgery this morning.  She still seems very

sleepy.  She is on aspirin, Norvasc, Lipitor, Toprol.



On exam, comfortable at rest.  Vital signs are stable. Chest exam reveals good air

entry bilaterally. Heart exam reveals first and second heart sounds.  No gallop.

Examination of extremities did not reveal any edema.  Peripheral pulses are felt.



Patient had an echocardiogram on this admission that revealed normal LV function.



ASSESSMENT:

1. Right hip fracture, status post surgery.

2. Coronary artery disease, status post angioplasty.

3. Hypertension.

4. Dyslipidemia.

The patient tolerated the surgery well.  Continue current medications.  Follow up with

her own cardiologist upon discharge.





MMODL / IJN: [id number] / Job#: [id number]

## 2022-06-19 NOTE — P.PN
Progress Note - Text


Progress Note Date: 06/19/22





Delayed charting pt s/e in PACU around 1230pm





Pt doing well VSS nursing at bedside.  Stat labs came back with hgB 10.  Hold 

transfusion for now.  Her dressing is CDI.  She is moving all 4 ext with good 

strength.  2/4 DP/PT pulses. Compartments compressible.  Right thigh swollen and

bruised, to be expected.  Ice being applied.  She is still fairly groggy but  

answers appropriately and follows all commands.  She will be transferred to the 

floor when awake and stable per PACU staff and anesthesia.

## 2022-06-19 NOTE — P.PN
Progress Note - Text


Progress Note Date: 06/19/22





                     [facility]


                                  Progress Note














SUBJECTIVE:


[]








OBJECTIVE:


VSS


AOX3


Motor Exam: 





RUE: 5/5 SA, EF, EE, WF, WE, Intrinsic, 


LUE: 5/5 SA, EF, EE, WF, WE, Intrinsic, 


RLE: 5/5  DF, PF, EHL, FHL with pain due to fracture


LLE: 5/5 HF, KE, KF, DF, PF, EHL, FH





Reflexes: 2/4 in UE and LE b/l








SILT C5-T1 and L2-S1


Dermatomal deficit: None








+distal pulses palpable





Negative hoffmans b/l


Negative babinski b/l


No clonus








ASSESSMENT:


76 yo female Right subtrochanteric fracture, 3 part displaced





s/p FFS





Hx MI CA








PLAN:


1. Confirmed NPO


2. Consent confirmed


3. Site marked


4. Pre op meds confirmed


5. Discussed with family risks benefits possible outomes and limitations again 

as outlined in the risk review and they understand and are on board with 

surgical intervention at this time. 


6. Confirmed cardio and med clearances

## 2022-06-19 NOTE — FL
Intraoperative fluoroscopic services were provided for internal fixation of a  right femur  . Total f
luoroscopy time is  3 minutes 1 second with a total of 17 submitted images to PACS. Please see the op
erative note for further details.

## 2022-06-19 NOTE — P.PN
Subjective


Progress Note Date: 06/19/22











Amanda Pruitt,  is a 77-year-old female who presented to [facility] emergency room after sustaining a fall at home, patient stated that she

fell in her basement on the concrete floor and was having pain in the right 

thigh area she was unable to ambulate, she denies any other area of pain , she 

denies any head trauma when she fell there was no loss of consciousness , EMS 

were called and she was brought into emergency room.


She was evaluated in the emergency room vital examination on presentation 

revealed a temperature of 97.3 pulse 61 respirations 16 blood pressure 158/84 

pulse ox 98% on room air


Laboratory data revealed a white blood count of 13.0 hemoglobin 13.1 platelet 

count 266 BUN 19 creatinine 0.74 urine analysis did not reveal any evidence of 

urinary tract infection


Testing in the emergency room revealed x-ray of the right femur revealed 

comminuted oblique fracture in the proximal shaft and subtrochanteric region of 

the right femur, chest x-ray revealed marked cardiomegaly no acute process.  EKG

was done in the emergency room and revealed sinus rhythm with first-degree AV 

block and right bundle branch block and T-wave abnormality in the inferior leads


Patient was admitted to medical floor for further evaluation and treatment.


Past medical history is significant for history of hypertension, history of 

hyperlipidemia, history of coronary artery disease with history of myocardial 

infarction with cardiac arrest in 2019, patient had angioplasty with 2 stent 

placement at that time in [place], she states that she has been following with 

cardiology since then and had no further cardiac issues.  Past medical history 

is also significant for history of breast cancer, with history of left breast 

lumpectomy, and history of osteoarthritis with history of left total knee 

arthroplasty.





On 06/19/2022 patient was seen and examined on the medical floor she is alert 

and oriented 3 in no apparent distress there is no fever or chills no headache 

or dizziness no chest pain no shortness of breath no cough no nausea or vomiting

no abdominal pain no diarrhea no urinary symptoms, labs are still pending for 

today, patient is scheduled for surgery this morning








Objective





- Vital Signs


Vital signs: 


                                   Vital Signs











Temp  97.8 F   06/19/22 08:00


 


Pulse  72   06/19/22 08:00


 


Resp  17   06/19/22 08:00


 


BP  136/68   06/19/22 08:00


 


Pulse Ox  90 L  06/19/22 08:00


 


FiO2      








                                 Intake & Output











 06/18/22 06/19/22 06/19/22





 18:59 06:59 18:59


 


Intake Total   51


 


Output Total 1400 600 


 


Balance -1400 -600 51


 


Intake:   


 


  IV   51


 


Output:   


 


  Urine 1400 600 


 


Other:   


 


  Voiding Method   Indwelling Catheter














- Exam








In general patient is alert and oriented x 3 in no distress


HEENT head normocephalic and atraumatic


Neck is supple no JVD no goiter no lymphadenopathy no carotid bruit


Chest examination is clear to auscultation no crackles no wheezing


Cardiac exam reveals regular heart sounds S1 and S2 no gallops no murmurs


Abdomen is soft nontender no organomegaly with normal bowel sounds


Extremity exam reveals no edema no cyanosis or clubbing


Neurological examination reveals no gross focal deficits





- Labs


CBC & Chem 7: 


                                 06/19/22 06:55





                                 06/19/22 06:55


Labs: 


                  Abnormal Lab Results - Last 24 Hours (Table)











  06/19/22 06/19/22 Range/Units





  06:55 06:55 


 


RBC  2.95 L   (4.10-5.20)  X 10*6/uL


 


Hgb  9.6 L   (12.0-15.0)  g/dL


 


Hct  28.4 L   (37.2-46.3)  %


 


MCH  32.5 H   (27.0-32.0)  pg


 


Immature Gran #  0.05 H   (0.00-0.04)  X 10*3/uL


 


Sodium   134 L  (135-145)  mmol/L


 


Potassium   3.0 L  (3.5-5.5)  mmol/L


 


Anion Gap   9.10 L  (10.00-18.00)  mmol/L


 


BUN   8.4 L  (9.0-27.0)  mg/dL


 


Creatinine   0.5 L  (0.6-1.5)  mg/dL


 


Glucose   123 H  ()  mg/dL


 


Calcium   7.6 L  (8.7-10.3)  mg/dL


 


Total Protein   4.9 L  (6.2-8.2)  g/dL


 


Albumin   3.2 L  (3.8-4.9)  g/dL














Assessment and Plan


Plan: 








Fall with right femur fracture





Underlying history of hypertension





Underlying history of hyperlipidemia





Underlying history of coronary artery disease with history of coronary artery 

angioplasty and stent placement 2 in 2019





Previous history of breast cancer with left lumpectomy








At this time patient was admitted to medical floor


Home medications reviewed and reordered


EKG and chest x-ray reviewed no acute abnormality


Echocardiogram ordered and cardiology consult for clearance requested due to 

history of coronary artery disease


Recheck labs in a.m. Will follow closely

## 2022-06-20 LAB
ALBUMIN SERPL-MCNC: 3.2 G/DL (ref 3.8–4.9)
ALBUMIN/GLOB SERPL: 1.88 G/DL (ref 1.6–3.17)
ALP SERPL-CCNC: 56 U/L (ref 41–126)
ALT SERPL-CCNC: 28 U/L (ref 8–44)
ANION GAP SERPL CALC-SCNC: 10.5 MMOL/L (ref 10–18)
AST SERPL-CCNC: 49 U/L (ref 13–35)
BASOPHILS # BLD AUTO: 0.01 X 10*3/UL (ref 0–0.1)
BASOPHILS NFR BLD AUTO: 0.1 %
BUN SERPL-SCNC: 9.3 MG/DL (ref 9–27)
BUN/CREAT SERPL: 15.5 RATIO (ref 12–20)
CALCIUM SPEC-MCNC: 8.1 MG/DL (ref 8.7–10.3)
CHLORIDE SERPL-SCNC: 104 MMOL/L (ref 96–109)
CO2 SERPL-SCNC: 21.5 MMOL/L (ref 20–27.5)
EOSINOPHIL # BLD AUTO: 0 X 10*3/UL (ref 0.04–0.35)
EOSINOPHIL NFR BLD AUTO: 0 %
ERYTHROCYTE [DISTWIDTH] IN BLOOD BY AUTOMATED COUNT: 2.41 X 10*6/UL (ref 4.1–5.2)
ERYTHROCYTE [DISTWIDTH] IN BLOOD: 11.6 % (ref 11.5–14.5)
GLOBULIN SER CALC-MCNC: 1.7 G/DL (ref 1.6–3.3)
GLUCOSE SERPL-MCNC: 202 MG/DL (ref 70–110)
HCT VFR BLD AUTO: 23.2 % (ref 37.2–46.3)
HGB BLD-MCNC: 7.9 G/DL (ref 12–15)
IMM GRANULOCYTES BLD QL AUTO: 0.4 %
LYMPHOCYTES # SPEC AUTO: 0.64 X 10*3/UL (ref 0.9–5)
LYMPHOCYTES NFR SPEC AUTO: 5.6 %
MCH RBC QN AUTO: 32.8 PG (ref 27–32)
MCHC RBC AUTO-ENTMCNC: 34.1 G/DL (ref 32–37)
MCV RBC AUTO: 96.3 FL (ref 80–97)
MONOCYTES # BLD AUTO: 1.2 X 10*3/UL (ref 0.2–1)
MONOCYTES NFR BLD AUTO: 10.4 %
NEUTROPHILS # BLD AUTO: 9.63 X 10*3/UL (ref 1.8–7.7)
NEUTROPHILS NFR BLD AUTO: 83.5 %
NRBC BLD AUTO-RTO: 0 /100 WBCS (ref 0–0)
PLATELET # BLD AUTO: 164 X 10*3/UL (ref 140–440)
POTASSIUM SERPL-SCNC: 3.5 MMOL/L (ref 3.5–5.5)
PROT SERPL-MCNC: 4.9 G/DL (ref 6.2–8.2)
SODIUM SERPL-SCNC: 136 MMOL/L (ref 135–145)
WBC # BLD AUTO: 11.53 X 10*3/UL (ref 4.5–10)

## 2022-06-20 RX ADMIN — HYDROCODONE BITARTRATE AND ACETAMINOPHEN PRN EACH: 5; 325 TABLET ORAL at 07:24

## 2022-06-20 RX ADMIN — CEFAZOLIN SCH MLS/HR: 330 INJECTION, POWDER, FOR SOLUTION INTRAMUSCULAR; INTRAVENOUS at 05:46

## 2022-06-20 RX ADMIN — CYCLOBENZAPRINE HYDROCHLORIDE SCH MG: 10 TABLET, FILM COATED ORAL at 23:05

## 2022-06-20 RX ADMIN — HYDROCODONE BITARTRATE AND ACETAMINOPHEN PRN EACH: 5; 325 TABLET ORAL at 14:57

## 2022-06-20 RX ADMIN — DOCUSATE SODIUM AND SENNOSIDES SCH EACH: 50; 8.6 TABLET ORAL at 23:06

## 2022-06-20 RX ADMIN — ASPIRIN 81 MG CHEWABLE TABLET SCH MG: 81 TABLET CHEWABLE at 07:25

## 2022-06-20 RX ADMIN — LOSARTAN POTASSIUM SCH: 50 TABLET, FILM COATED ORAL at 09:10

## 2022-06-20 RX ADMIN — ENOXAPARIN SODIUM SCH MG: 40 INJECTION SUBCUTANEOUS at 11:25

## 2022-06-20 RX ADMIN — CEFAZOLIN SCH MLS/HR: 330 INJECTION, POWDER, FOR SOLUTION INTRAMUSCULAR; INTRAVENOUS at 16:18

## 2022-06-20 RX ADMIN — ACETAMINOPHEN PRN MG: 325 TABLET, FILM COATED ORAL at 23:05

## 2022-06-20 RX ADMIN — Medication PRN MG: at 23:05

## 2022-06-20 RX ADMIN — CYCLOBENZAPRINE HYDROCHLORIDE SCH MG: 10 TABLET, FILM COATED ORAL at 07:25

## 2022-06-20 RX ADMIN — Medication PRN MG: at 01:25

## 2022-06-20 RX ADMIN — ONDANSETRON PRN MG: 2 INJECTION INTRAMUSCULAR; INTRAVENOUS at 01:25

## 2022-06-20 RX ADMIN — ATORVASTATIN CALCIUM SCH MG: 40 TABLET, FILM COATED ORAL at 23:05

## 2022-06-20 RX ADMIN — CYCLOBENZAPRINE HYDROCHLORIDE SCH MG: 10 TABLET, FILM COATED ORAL at 16:19

## 2022-06-20 RX ADMIN — CHOLECALCIFEROL TAB 125 MCG (5000 UNIT) SCH MCG: 125 TAB at 23:05

## 2022-06-20 RX ADMIN — HYDROCODONE BITARTRATE AND ACETAMINOPHEN PRN EACH: 5; 325 TABLET ORAL at 01:25

## 2022-06-20 NOTE — P.PN
Subjective


Progress Note Date: 06/20/22


Principal diagnosis: 


right hip subtrochanteric fracture status post fall





patient was seen at bedside this morning with family present during encounter. 

patient says she did get up with the physical therapy earlier today and was able

to stand up at bedside with assistance and a walker and was able to bear weight 

on the right lower extremity.  Patient says he was able to walk to the chair and

sat in the chair for several hours.  Patient says she has been pumping her ankle

10 times each hour.  Patient says she has not had bowel movement yet, however, 

patient says she has been passing gas.  Patient denies chest pain, fever, 

shortness of breath, nausea, vomiting, change in vision, loss of bowel/bladder 

control.








Objective





- Vital Signs


Vital signs: 


                                   Vital Signs











Temp  98 F   06/20/22 08:18


 


Pulse  61   06/20/22 09:09


 


Resp  15   06/20/22 08:18


 


BP  104/58   06/20/22 09:09


 


Pulse Ox  95   06/20/22 08:18


 


FiO2      








                                 Intake & Output











 06/19/22 06/20/22 06/20/22





 18:59 06:59 18:59


 


Intake Total 2051 480 


 


Output Total 1000 2000 800


 


Balance 1051 -1520 -800


 


Weight 63.503 kg  


 


Intake:   


 


  IV 2051  


 


  Oral  480 


 


Output:   


 


  Urine 600 2000 800


 


    Uretheral (Henriquez)  2000 800


 


  Estimated Blood Loss 400  


 


Other:   


 


  Voiding Method Indwelling Catheter Indwelling Catheter Indwelling Catheter














- Exam


Optifoam dressings present on right lateral hip/upper leg currently. Minimal 

drainage at this time.  Patient has moderate tenderness to palpation along the 

right lateral hip near incisions.  Sensation is equal, symmetric, bilateral 

intact throughout the upper and lower extremities.  Patient does have limited 

range of motion in right hip flexion/extension as well as knee flexion/extension

due to pain.  Patient has full range of motion in bilateral dorsi/plantar 

flexion of the ankles.  Patient is able wiggle digits in feet.  Patient has full

range of motion bilateral upper extremities as well as left lower extremity.  

4+/5 strength in bilateral upper extremities and left lower extremity.  3+/5 and

right lower extremity in resisted hip flexion/extension and knee 

flexion/extension.  Neurovascular status is intact bilaterally.  Cap refill 

under 3 seconds in digits of upper extremities.  Radial pulse intact, 

bilaterally, 2+. DP pulses intact bilaterally.  Negative Homans bilaterally.








- Labs


CBC & Chem 7: 


                                 06/20/22 04:20





                                 06/20/22 04:20


Labs: 


                  Abnormal Lab Results - Last 24 Hours (Table)











  06/20/22 06/20/22 Range/Units





  04:20 04:20 


 


WBC  11.53 H   (4.50-10.00)  X 10*3/uL


 


RBC  2.41 L   (4.10-5.20)  X 10*6/uL


 


Hgb  7.9 L   (12.0-15.0)  g/dL


 


Hct  23.2 L   (37.2-46.3)  %


 


MCH  32.8 H   (27.0-32.0)  pg


 


Immature Gran #  0.05 H   (0.00-0.04)  X 10*3/uL


 


Neutrophils #  9.63 H   (1.80-7.70)  X 10*3/uL


 


Lymphocytes #  0.64 L   (0.90-5.00)  X 10*3/uL


 


Monocytes #  1.20 H   (0.20-1.00)  X 10*3/uL


 


Eosinophils #  0 L   (0.04-0.35)  X 10*3/uL


 


Glucose   202 H  ()  mg/dL


 


Calcium   8.1 L  (8.7-10.3)  mg/dL


 


AST   49 H  (13-35)  U/L


 


Total Protein   4.9 L  (6.2-8.2)  g/dL


 


Albumin   3.2 L  (3.8-4.9)  g/dL














Assessment and Plan


Assessment: 


1. right subtrochanteric hip fracture





- Postoperative day #1 status post right hip IM nail





Plan: 


1. right subtrochanteric hip fracture - right hip IM nail surgery performed 

yesterday, Sunday, 06/19/2022.  Patient stable at bedside this morning with 

family present.  Surgical dressings are intact at this time with minimal draina

ge.  Patient did work with therapy earlier today and was able to stand up at 

bedside and bear weight on the right lower extremity.  Continue with pain meds. 

We'll continue to follow patient while in hospital.  Plan for discharge to [facility] 

later this week.





2. appreciate medical management





3.  DVT prophylaxis - Lovenox





4.  Pain management - Tylenol; Norco; Flexeril; IV meds as necessary





5.  GI prophylaxis - senna





6.  PT/OT - weightbearing as tolerated with walker/assistance





7.  Encourage incentive spirometer use





8.  Discharge planning - plan for discharge to [facility] later this week





Time with Patient: Less than 30

## 2022-06-20 NOTE — P.PN
Subjective


Progress Note Date: 06/20/22











Amanda Pruitt,  is a 77-year-old female who presented to [facility] emergency room after sustaining a fall at home, patient stated that she

fell in her basement on the concrete floor and was having pain in the right 

thigh area she was unable to ambulate, she denies any other area of pain , she 

denies any head trauma when she fell there was no loss of consciousness , EMS 

were called and she was brought into emergency room.


She was evaluated in the emergency room vital examination on presentation 

revealed a temperature of 97.3 pulse 61 respirations 16 blood pressure 158/84 

pulse ox 98% on room air


Laboratory data revealed a white blood count of 13.0 hemoglobin 13.1 platelet 

count 266 BUN 19 creatinine 0.74 urine analysis did not reveal any evidence of 

urinary tract infection


Testing in the emergency room revealed x-ray of the right femur revealed 

comminuted oblique fracture in the proximal shaft and subtrochanteric region of 

the right femur, chest x-ray revealed marked cardiomegaly no acute process.  EKG

was done in the emergency room and revealed sinus rhythm with first-degree AV 

block and right bundle branch block and T-wave abnormality in the inferior leads


Patient was admitted to medical floor for further evaluation and treatment.


Past medical history is significant for history of hypertension, history of 

hyperlipidemia, history of coronary artery disease with history of myocardial 

infarction with cardiac arrest in 2019, patient had angioplasty with 2 stent 

placement at that time in [place], she states that she has been following with 

cardiology since then and had no further cardiac issues.  Past medical history 

is also significant for history of breast cancer, with history of left breast 

lumpectomy, and history of osteoarthritis with history of left total knee 

arthroplasty.





On 06/19/2022 patient was seen and examined on the medical floor she is alert 

and oriented 3 in no apparent distress there is no fever or chills no headache 

or dizziness no chest pain no shortness of breath no cough no nausea or vomiting

no abdominal pain no diarrhea no urinary symptoms, labs are still pending for 

today, patient is scheduled for surgery this morning





On 06/20/2022 patient was seen and examined on the medical floor she is alert 

and oriented 3 in no apparent distress, she is complaining of pain in the lower

extremity, she is also complaining of constipation otherwise she denies any 

complaints there is no fever or chills no headache or dizziness no chest pain no

shortness of breath no cough no nausea or vomiting no abdominal pain no diarrhea

and no urinary symptoms, hemoglobin today is down to 7.9 will continue to 

monitor





Objective





- Vital Signs


Vital signs: 


                                   Vital Signs











Temp  98 F   06/20/22 08:18


 


Pulse  61   06/20/22 09:09


 


Resp  15   06/20/22 08:18


 


BP  104/58   06/20/22 09:09


 


Pulse Ox  95   06/20/22 08:18


 


FiO2      








                                 Intake & Output











 06/19/22 06/20/22 06/20/22





 18:59 06:59 18:59


 


Intake Total 2051 480 


 


Output Total 1000 2000 800


 


Balance 1051 -1520 -800


 


Weight 63.503 kg  


 


Intake:   


 


  IV 2051  


 


  Oral  480 


 


Output:   


 


  Urine 600 2000 800


 


    Uretheral (Henriquez)  2000 800


 


  Estimated Blood Loss 400  


 


Other:   


 


  Voiding Method Indwelling Catheter Indwelling Catheter Indwelling Catheter














- Exam








In general patient is alert and oriented x 3 in no distress


HEENT head normocephalic and atraumatic


Neck is supple no JVD no goiter no lymphadenopathy no carotid bruit


Chest examination is clear to auscultation no crackles no wheezing


Cardiac exam reveals regular heart sounds S1 and S2 no gallops no murmurs


Abdomen is soft nontender no organomegaly with normal bowel sounds


Extremity exam reveals no edema no cyanosis or clubbing


Neurological examination reveals no gross focal deficits





- Labs


CBC & Chem 7: 


                                 06/20/22 04:20





                                 06/20/22 04:20


Labs: 


                  Abnormal Lab Results - Last 24 Hours (Table)











  06/19/22 06/19/22 06/20/22 Range/Units





  12:48 13:35 04:20 


 


WBC  18.2 H   11.53 H  (3.8-10.6)  k/uL


 


RBC  3.09 L   2.41 L  (3.80-5.40)  m/uL


 


Hgb  10.6 L   7.9 L  (11.4-16.0)  gm/dL


 


Hct  30.6 L   23.2 L  (34.0-46.0)  %


 


MCH    32.8 H  (27.0-32.0)  pg


 


Immature Gran #    0.05 H  (0.00-0.04)  X 10*3/uL


 


Neutrophils #  15.8 H   9.63 H  (1.3-7.7)  k/uL


 


Lymphocytes #    0.64 L  (0.90-5.00)  X 10*3/uL


 


Monocytes #    1.20 H  (0.20-1.00)  X 10*3/uL


 


Eosinophils #    0 L  (0.04-0.35)  X 10*3/uL


 


Sodium   131 L   (137-145)  mmol/L


 


Potassium   3.4 L   (3.5-5.1)  mmol/L


 


Carbon Dioxide   21 L   (22-30)  mmol/L


 


Creatinine   0.44 L   (0.52-1.04)  mg/dL


 


Glucose   158 H   (74-99)  mg/dL


 


Calcium   7.3 L   (8.4-10.2)  mg/dL


 


AST     (13-35)  U/L


 


Total Protein     (6.2-8.2)  g/dL


 


Albumin     (3.8-4.9)  g/dL














  06/20/22 Range/Units





  04:20 


 


WBC   (3.8-10.6)  k/uL


 


RBC   (3.80-5.40)  m/uL


 


Hgb   (11.4-16.0)  gm/dL


 


Hct   (34.0-46.0)  %


 


MCH   (27.0-32.0)  pg


 


Immature Gran #   (0.00-0.04)  X 10*3/uL


 


Neutrophils #   (1.3-7.7)  k/uL


 


Lymphocytes #   (0.90-5.00)  X 10*3/uL


 


Monocytes #   (0.20-1.00)  X 10*3/uL


 


Eosinophils #   (0.04-0.35)  X 10*3/uL


 


Sodium   (137-145)  mmol/L


 


Potassium   (3.5-5.1)  mmol/L


 


Carbon Dioxide   (22-30)  mmol/L


 


Creatinine   (0.52-1.04)  mg/dL


 


Glucose  202 H  (74-99)  mg/dL


 


Calcium  8.1 L  (8.4-10.2)  mg/dL


 


AST  49 H  (13-35)  U/L


 


Total Protein  4.9 L  (6.2-8.2)  g/dL


 


Albumin  3.2 L  (3.8-4.9)  g/dL














Assessment and Plan


Plan: 








Fall with right femur fracture





Underlying history of hypertension





Underlying history of hyperlipidemia





Underlying history of coronary artery disease with history of coronary artery 

angioplasty and stent placement 2 in 2019





Previous history of breast cancer with left lumpectomy








At this time patient was admitted to medical floor


Home medications reviewed and reordered


EKG and chest x-ray reviewed no acute abnormality


Echocardiogram ordered and cardiology consult for clearance requested due to 

history of coronary artery disease


Recheck labs in a.m. Will follow closely

## 2022-06-20 NOTE — P.PN
Progress Note - Text


Progress Note Date: 06/20/22





                     [facility]


                                  Progress Note


Patient seen and examined, I reviewed the note, discussed the case with the PA 

first hand and agree with the assessment and plan of BRITTNI Mallory.  Please 

see my notes below for any additional recommendations.





DOS: 06/19/2022


POD:1





SUBJECTIVE:


Patient seen and examined she is sitting up about to eat lunch doing fairly well

states her leg is sore and it is hard to ambulate with it but she is doing okay.

 She denies any fevers chills shortness breath or chest pain








OBJECTIVE:


Vital signs stable


General: AOX3, NAD


Incision CDI


Dressing CDI








Motor Exam: 





RUE: 5/5 SA, EF, EE, WF, WE, Intrinsic, 


LUE: 5/5 SA, EF, EE, WF, WE, Intrinsic, 


RLE: 5/5  DF, PF, EHL, FHL  3/5 HF/KE/KF due to fracture and pain and surgery


LLE: 5/5 HF, KE, KF, DF, PF, EHL, FH Deconditionoing





Reflexes: 2/4 in UE and LE b/l








SILT C5-T1 and L2-S1


Compartment soft and compressible


+distal pulses palpable














ASSESSMENT:


76 yo female s/p ORIF with IMN fixation right subtrochanteric fracture








PLAN:


1. 25% WB RLE with assist


2. Pain control as needed


3. PT/OT, OK for up and about with assist


4. TEDs, SCDs, Early ambulation


5. Lovenox 4 weeks post op


6. GI ppx


7. Ice/rest

## 2022-06-21 LAB
BASOPHILS # BLD AUTO: 0.02 X 10*3/UL (ref 0–0.1)
BASOPHILS NFR BLD AUTO: 0.2 %
EOSINOPHIL # BLD AUTO: 0.14 X 10*3/UL (ref 0.04–0.35)
EOSINOPHIL NFR BLD AUTO: 1.3 %
ERYTHROCYTE [DISTWIDTH] IN BLOOD BY AUTOMATED COUNT: 2.38 X 10*6/UL (ref 4.1–5.2)
ERYTHROCYTE [DISTWIDTH] IN BLOOD: 12.5 % (ref 11.5–14.5)
HCT VFR BLD AUTO: 23.7 % (ref 37.2–46.3)
HGB BLD-MCNC: 7.9 G/DL (ref 12–15)
IMM GRANULOCYTES BLD QL AUTO: 0.7 %
LYMPHOCYTES # SPEC AUTO: 1.78 X 10*3/UL (ref 0.9–5)
LYMPHOCYTES NFR SPEC AUTO: 15.9 %
MCH RBC QN AUTO: 33.2 PG (ref 27–32)
MCHC RBC AUTO-ENTMCNC: 33.3 G/DL (ref 32–37)
MCV RBC AUTO: 99.6 FL (ref 80–97)
MONOCYTES # BLD AUTO: 1 X 10*3/UL (ref 0.2–1)
MONOCYTES NFR BLD AUTO: 8.9 %
NEUTROPHILS # BLD AUTO: 8.16 X 10*3/UL (ref 1.8–7.7)
NEUTROPHILS NFR BLD AUTO: 73 %
NRBC BLD AUTO-RTO: 0.3 /100 WBCS (ref 0–0)
PLATELET # BLD AUTO: 216 X 10*3/UL (ref 140–440)
WBC # BLD AUTO: 11.18 X 10*3/UL (ref 4.5–10)

## 2022-06-21 RX ADMIN — CEFAZOLIN SCH MLS/HR: 330 INJECTION, POWDER, FOR SOLUTION INTRAMUSCULAR; INTRAVENOUS at 04:00

## 2022-06-21 RX ADMIN — HYDROCODONE BITARTRATE AND ACETAMINOPHEN PRN EACH: 5; 325 TABLET ORAL at 20:52

## 2022-06-21 RX ADMIN — METOPROLOL SUCCINATE SCH: 25 TABLET, EXTENDED RELEASE ORAL at 03:00

## 2022-06-21 RX ADMIN — ACETAMINOPHEN PRN MG: 325 TABLET, FILM COATED ORAL at 14:19

## 2022-06-21 RX ADMIN — ENOXAPARIN SODIUM SCH MG: 40 INJECTION SUBCUTANEOUS at 08:58

## 2022-06-21 RX ADMIN — LOSARTAN POTASSIUM SCH MG: 50 TABLET, FILM COATED ORAL at 08:58

## 2022-06-21 RX ADMIN — ASPIRIN 81 MG CHEWABLE TABLET SCH MG: 81 TABLET CHEWABLE at 08:59

## 2022-06-21 RX ADMIN — CYCLOBENZAPRINE HYDROCHLORIDE SCH MG: 10 TABLET, FILM COATED ORAL at 16:48

## 2022-06-21 RX ADMIN — CEFAZOLIN SCH MLS/HR: 330 INJECTION, POWDER, FOR SOLUTION INTRAMUSCULAR; INTRAVENOUS at 10:23

## 2022-06-21 RX ADMIN — ATORVASTATIN CALCIUM SCH MG: 40 TABLET, FILM COATED ORAL at 20:51

## 2022-06-21 RX ADMIN — HYDROCODONE BITARTRATE AND ACETAMINOPHEN PRN EACH: 5; 325 TABLET ORAL at 10:26

## 2022-06-21 RX ADMIN — DOCUSATE SODIUM AND SENNOSIDES SCH EACH: 50; 8.6 TABLET ORAL at 20:51

## 2022-06-21 RX ADMIN — CYCLOBENZAPRINE HYDROCHLORIDE SCH MG: 10 TABLET, FILM COATED ORAL at 08:59

## 2022-06-21 RX ADMIN — METOPROLOL SUCCINATE SCH MG: 25 TABLET, EXTENDED RELEASE ORAL at 20:53

## 2022-06-21 RX ADMIN — CHOLECALCIFEROL TAB 125 MCG (5000 UNIT) SCH MCG: 125 TAB at 20:53

## 2022-06-21 RX ADMIN — BENZOCAINE AND MENTHOL PRN EACH: 15; 3.6 LOZENGE ORAL at 08:53

## 2022-06-21 RX ADMIN — CEFAZOLIN SCH MLS/HR: 330 INJECTION, POWDER, FOR SOLUTION INTRAMUSCULAR; INTRAVENOUS at 22:20

## 2022-06-21 RX ADMIN — CYCLOBENZAPRINE HYDROCHLORIDE SCH MG: 10 TABLET, FILM COATED ORAL at 22:17

## 2022-06-21 RX ADMIN — BENZOCAINE AND MENTHOL PRN EACH: 15; 3.6 LOZENGE ORAL at 20:47

## 2022-06-21 NOTE — P.PN
Subjective


Progress Note Date: 06/21/22


Principal diagnosis: 


right hip subtrochanteric fracture status post fall





patient was seen at bedside this morning eating breakfast. patient says she did 

get up with the physical therapy yesterday and was able to stand up at bedside 

with assistance and a walker and was able to bear some weight on the right lower

extremity.  Patient says she was able to walk to the chair and sat in the chair 

for several hours.  Patient says she has been pumping her ankle 10 times each 

hour.  Patient says she has not had bowel movement yet, however, patient says 

she has been passing gas.  Patient denies chest pain, fever, shortness of 

breath, nausea, vomiting, change in vision, loss of bowel/bladder control.








Objective





- Vital Signs


Vital signs: 


                                   Vital Signs











Temp  98.5 F   06/21/22 07:53


 


Pulse  79   06/21/22 07:53


 


Resp  16   06/21/22 02:00


 


BP  120/73   06/21/22 07:53


 


Pulse Ox  94 L  06/21/22 07:53


 


FiO2      








                                 Intake & Output











 06/20/22 06/21/22 06/21/22





 18:59 06:59 18:59


 


Output Total 800 900 


 


Balance -800 -900 


 


Output:   


 


  Urine 800 900 


 


    Uretheral (Henriquez) 800  


 


Other:   


 


  Voiding Method Indwelling Catheter  


 


  # Voids  1 


 


  # Bowel Movements  1 














- Exam


Optifoam dressings present on right lateral hip/upper leg currently. Minimal 

drainage at this time.  Patient has moderate tenderness to palpation along the 

right lateral hip near incisions.  Sensation is equal, symmetric, bilateral 

intact throughout the upper and lower extremities.  Patient does have limited 

range of motion in right hip flexion/extension as well as knee flexion/extension

due to pain.  Patient has full range of motion in bilateral dorsi/plantar 

flexion of the ankles.  Patient is able wiggle digits in feet.  Patient has full

range of motion bilateral upper extremities as well as left lower extremity.  

4+/5 strength in bilateral upper extremities and left lower extremity.  3+/5 and

right lower extremity in resisted hip flexion/extension and knee 

flexion/extension.  Neurovascular status is intact bilaterally.  Cap refill 

under 3 seconds in digits of upper extremities.  Radial pulse intact, 

bilaterally, 2+. DP pulses intact bilaterally.  Negative Homans bilaterally.








- Labs


CBC & Chem 7: 


                                 06/20/22 04:20





                                 06/20/22 04:20


Labs: 


                  Abnormal Lab Results - Last 24 Hours (Table)











  06/20/22 Range/Units





  04:20 


 


Glucose  202 H  ()  mg/dL


 


Calcium  8.1 L  (8.7-10.3)  mg/dL


 


AST  49 H  (13-35)  U/L


 


Total Protein  4.9 L  (6.2-8.2)  g/dL


 


Albumin  3.2 L  (3.8-4.9)  g/dL














Assessment and Plan


Assessment: 


1. right subtrochanteric hip fracture





- Postoperative day #2 status post right hip IM nail





Plan: 


1. right subtrochanteric hip fracture - right hip IM nail surgery performed 

Sunday, 06/19/2022.  Patient stable at bedside this morning.  Surgical dressings

are intact at this time with minimal drainage.  Patient did work with therapy 

yesterday and was able to stand up at bedside and bear weight on the right lower

extremity.  Continue with pain meds. We'll continue to follow patient while in 

hospital.  Plan for discharge to [facility], Wednesday, 6/22/2022 vs Thursday 6/23/2022





2.  Appreciate medical management





3.  DVT prophylaxis - Lovenox





4.  Pain management - Tylenol; Norco; Flexeril; IV meds as necessary





5.  GI prophylaxis - senna





6.  PT/OT - 25% weightbearing on RLE with walker/assistance





7.  Encourage incentive spirometer use





8.  Discharge planning - Plan for discharge to [facility], Wednesday, 6/22/2022 vs

Thursday 6/23/2022





Time with Patient: Less than 30

## 2022-06-21 NOTE — P.PN
Subjective


Progress Note Date: 06/21/22


Principal diagnosis: 





right femur fracture


patient seen and examined she's doing OK today her pain is controlled she is 

worried about her motion and her weight-bearing status so we went through this 

again. She denies any numbness or tingling but she does state weakness in her 

legs right now due to the pain as well as her debility. She denies it fevers 

chills certain breath or chest pain overnight.





Objective





- Vital Signs


Vital signs: 


                                   Vital Signs











Temp  98.1 F   06/21/22 02:00


 


Pulse  82   06/21/22 02:00


 


Resp  16   06/21/22 02:00


 


BP  125/70   06/21/22 02:00


 


Pulse Ox  92 L  06/21/22 02:00


 


FiO2      








                                 Intake & Output











 06/20/22 06/21/22 06/21/22





 18:59 06:59 18:59


 


Output Total 800 900 


 


Balance -800 -900 


 


Output:   


 


  Urine 800 900 


 


    Uretheral (Cruz) 800  


 


Other:   


 


  Voiding Method Indwelling Catheter  


 


  # Voids  1 


 


  # Bowel Movements  1 














- Exam





Alert and oriented 3 appears well-nourished well-hydrated is no acute distress.

 Does not appear septic





Right lower extremity





Dressings clean and dry


Some swelling about the right lower extremity and knee and thigh the much better

some bruising noted as well


Mild tenderness to palpation around the incision sites


Compartments soft and compressive


2 out of 4 DP PT pulses distally


Sensation intact L2 S1


5 out of 5 dorsiflexion plantar flexion EHL FHL








- Labs


CBC & Chem 7: 


                                 06/20/22 04:20





                                 06/20/22 04:20


Labs: 


                  Abnormal Lab Results - Last 24 Hours (Table)











  06/20/22 06/20/22 Range/Units





  04:20 04:20 


 


WBC  11.53 H   (4.50-10.00)  X 10*3/uL


 


RBC  2.41 L   (4.10-5.20)  X 10*6/uL


 


Hgb  7.9 L   (12.0-15.0)  g/dL


 


Hct  23.2 L   (37.2-46.3)  %


 


MCH  32.8 H   (27.0-32.0)  pg


 


Immature Gran #  0.05 H   (0.00-0.04)  X 10*3/uL


 


Neutrophils #  9.63 H   (1.80-7.70)  X 10*3/uL


 


Lymphocytes #  0.64 L   (0.90-5.00)  X 10*3/uL


 


Monocytes #  1.20 H   (0.20-1.00)  X 10*3/uL


 


Eosinophils #  0 L   (0.04-0.35)  X 10*3/uL


 


Glucose   202 H  ()  mg/dL


 


Calcium   8.1 L  (8.7-10.3)  mg/dL


 


AST   49 H  (13-35)  U/L


 


Total Protein   4.9 L  (6.2-8.2)  g/dL


 


Albumin   3.2 L  (3.8-4.9)  g/dL














Assessment and Plan


Assessment: 


Postop day 2 ORIF right subtrochanteric fracture with intramedullary nail 

fixation


76 yo female s/p FFS





Right subtrochanteric hip fracture, displaced, rotated, comminuted, closed





Hx MI with CA








Plan: 


-Appreciate consultant and team management.  





-Activity: Ambulate QID, OOB all meals, Use walker or cane if needed for 

stability.  





   -Daily PT/OT, increase ambulation strength and balance. 


   -Assistance with ambulation





-Pain control: Adequate at this time





-Meds: reviewed





-GI ppx: senna, Miralax





-DC cruz when up and about, bedside commode if needed





-DVT PPX: Lovenox 4 weeks postop





-Hygiene: Shower today.  Maintain dressing clean and dry.  Meticulous cleaning 

after BMs away from incision site





-Encourage IS 10x/hr





-Dispo: [facility] when available orthopedically stable

## 2022-06-22 VITALS
DIASTOLIC BLOOD PRESSURE: 73 MMHG | RESPIRATION RATE: 17 BRPM | TEMPERATURE: 98.4 F | SYSTOLIC BLOOD PRESSURE: 152 MMHG | HEART RATE: 89 BPM

## 2022-06-22 LAB
ALBUMIN SERPL-MCNC: 3.1 G/DL (ref 3.8–4.9)
ALBUMIN/GLOB SERPL: 1.62 G/DL (ref 1.6–3.17)
ALP SERPL-CCNC: 84 U/L (ref 41–126)
ALT SERPL-CCNC: 89 U/L (ref 8–44)
ANION GAP SERPL CALC-SCNC: 12.1 MMOL/L (ref 10–18)
AST SERPL-CCNC: 94 U/L (ref 13–35)
BASOPHILS # BLD AUTO: 0.04 X 10*3/UL (ref 0–0.1)
BASOPHILS NFR BLD AUTO: 0.5 %
BUN SERPL-SCNC: 8.5 MG/DL (ref 9–27)
BUN/CREAT SERPL: 14.96 RATIO (ref 12–20)
CALCIUM SPEC-MCNC: 8.2 MG/DL (ref 8.7–10.3)
CHLORIDE SERPL-SCNC: 100 MMOL/L (ref 96–109)
CO2 SERPL-SCNC: 22.5 MMOL/L (ref 20–27.5)
EOSINOPHIL # BLD AUTO: 0.45 X 10*3/UL (ref 0.04–0.35)
EOSINOPHIL NFR BLD AUTO: 5.3 %
ERYTHROCYTE [DISTWIDTH] IN BLOOD BY AUTOMATED COUNT: 2.5 X 10*6/UL (ref 4.1–5.2)
ERYTHROCYTE [DISTWIDTH] IN BLOOD: 12.4 % (ref 11.5–14.5)
GLOBULIN SER CALC-MCNC: 1.9 G/DL (ref 1.6–3.3)
GLUCOSE SERPL-MCNC: 123 MG/DL (ref 70–110)
HCT VFR BLD AUTO: 24.4 % (ref 37.2–46.3)
HGB BLD-MCNC: 8.2 G/DL (ref 12–15)
IMM GRANULOCYTES BLD QL AUTO: 0.9 %
LYMPHOCYTES # SPEC AUTO: 1.86 X 10*3/UL (ref 0.9–5)
LYMPHOCYTES NFR SPEC AUTO: 22 %
MCH RBC QN AUTO: 32.8 PG (ref 27–32)
MCHC RBC AUTO-ENTMCNC: 33.6 G/DL (ref 32–37)
MCV RBC AUTO: 97.6 FL (ref 80–97)
MONOCYTES # BLD AUTO: 0.67 X 10*3/UL (ref 0.2–1)
MONOCYTES NFR BLD AUTO: 7.9 %
NEUTROPHILS # BLD AUTO: 5.36 X 10*3/UL (ref 1.8–7.7)
NEUTROPHILS NFR BLD AUTO: 63.4 %
NRBC BLD AUTO-RTO: 0.6 /100 WBCS (ref 0–0)
PLATELET # BLD AUTO: 236 X 10*3/UL (ref 140–440)
POTASSIUM SERPL-SCNC: 3.8 MMOL/L (ref 3.5–5.5)
PROT SERPL-MCNC: 5 G/DL (ref 6.2–8.2)
SODIUM SERPL-SCNC: 135 MMOL/L (ref 135–145)
WBC # BLD AUTO: 8.46 X 10*3/UL (ref 4.5–10)

## 2022-06-22 RX ADMIN — BENZOCAINE AND MENTHOL PRN EACH: 15; 3.6 LOZENGE ORAL at 01:07

## 2022-06-22 RX ADMIN — LOSARTAN POTASSIUM SCH MG: 50 TABLET, FILM COATED ORAL at 07:53

## 2022-06-22 RX ADMIN — Medication PRN MG: at 01:06

## 2022-06-22 RX ADMIN — ENOXAPARIN SODIUM SCH MG: 40 INJECTION SUBCUTANEOUS at 07:52

## 2022-06-22 RX ADMIN — CEFAZOLIN SCH MLS/HR: 330 INJECTION, POWDER, FOR SOLUTION INTRAMUSCULAR; INTRAVENOUS at 07:52

## 2022-06-22 RX ADMIN — ASPIRIN 81 MG CHEWABLE TABLET SCH MG: 81 TABLET CHEWABLE at 07:53

## 2022-06-22 RX ADMIN — CYCLOBENZAPRINE HYDROCHLORIDE SCH MG: 10 TABLET, FILM COATED ORAL at 07:53

## 2022-06-22 RX ADMIN — HYDROCODONE BITARTRATE AND ACETAMINOPHEN PRN EACH: 5; 325 TABLET ORAL at 13:14

## 2022-06-22 RX ADMIN — HYDROCODONE BITARTRATE AND ACETAMINOPHEN PRN EACH: 5; 325 TABLET ORAL at 07:56

## 2022-06-22 NOTE — P.DS
Providers


Date of admission: 


06/17/22 16:16





Expected date of discharge: 06/22/22


Attending physician: 


Gerson Li DO





Consults: 





                                        





06/17/22 16:16


Consult Physician Routine 


   Consulting Provider: Argelia De


   Consult Reason/Comments: medical management


   Do you want consulting provider notified?: Already Contacted





06/17/22 21:28


Consult Physician Routine 


   Consulting Provider: Rodolfo Collins


   Consult Reason/Comments: clearance for surgery


   Do you want consulting provider notified?: Yes, Notify in am











Primary care physician: 


Paris Ramirez





Hospital Course: 


Date of admission: 06/18/2022


Date of discharge: 06/22/2022





Admission diagnosis: right subtrochanteric hip fracture


Discharge diagnosis: Same





Attending physician: Dr. Li





Surgical procedures: Right hip IM nail





Brief history: Patient is a 77-year-old female with a history of right 

subtrochanteric hip fracture status post fall.  At this point patient has failed

conservative treatment measures and has opted to proceed with a elective right 

hip IM nail.





Hospital course: Details of patient's surgery can be found in operative report. 

Patient tolerated the procedure well and was subsequently transported to 

orthopedic floor.  Patient's orthopeidc and medical care was provided daily. 

Patient had daily laboratory tests performed for evaluation of overall blood 

counts.  Patient had daily physical therapy to include strengthening range of 

motion as well as education with walker ambulation. Patient was treated with 

Lovenox for their postoperative DVT prophylaxis during their inpatient stay.  

Patient was noted to have a relatively uneventful postoperative course.  Patient

reported satisfactory pain control with oral pain medications by postoperative 

day 3.  Patient showed satisfactory progress with physical therapy.  Patient 

moved steadily through the program and had no difficulty meeting the goals by 

postoperative day 3.  Given patient's otherwise satisfactory course and having 

met physical therapy goals, plan is to discharge patient to rehab on 

postoperative day 3.





Discharge condition/disposition: Patient will be discharged to rehab in stable 

condition.





Discharge medications: Instructions are given on resumption of patient's normal 

daily medications per primary care recommendation, in addition patient will be 

prescribed Norco 5 mg/325 mg; Flexeril 5 mg; Lovenox; Colace.





Discharge instructions:


1.  Wound care and infection precautions, keep incision dry and covered while 

showering, no lotions, creams, moisturizers. No soaking, tubs, pools, hottubs. 

Do not scrub over the incision.


2.  50% Weight-bearing right lower extremity with walker until follow-up.


3.  Ice and elevate when necessary.  Do not exceed 20 minutes per hour with ice 

pack.


4.  Utilize compression sleeve until seen at first follow up appointment.


5.  Visiting nursing care.


6.  Physical therapy


7.  Pain meds and anticoagulants per prescription.


8.  Pain medication has potential to cause constipation. Increase oral fluid and

fiber intake. Contact primary care provider if you have not had a bowel movement

within 48 hours after discharge


9.  No anti-inflammatory medication until discussed at first post operative 

visit, this including Motrin, Aleve, Mobic, Diclofenac.


10.  Follow up in office at 2 weeks postop with Dr. Gerson Li


11. Follow up with your primary care doctor 7-10 days after discharge.


12. Contact [facility] with any questions, [phone].





Keep silver foam dressings on until Monday, 06/27/2022. Dressings may be removed

on 6/27/2022. Try to keep incisions clean, dry. 











Assessment: 


right subtrochanteric hip fracture








Procedures: 


right hip IM nail





Patient Condition at Discharge: Good





Plan - Discharge Summary


Discharge Rx Participant: Yes


New Discharge Prescriptions: 


New


   HYDROcodone/APAP 5-325MG [Norco 5-325] 1 - 2 tab PO Q6HR PRN #27 tab


     PRN Reason: Pain


   Cyclobenzaprine [Flexeril] 5 mg PO TID #30 tablet


   Enoxaparin [Lovenox] 40 mg SQ DAILY #28 each


   Docusate [Colace] 100 mg PO DAILY #30 capsule





No Action


   RX: Melatonin 10 mg PO HS PRN


     PRN Reason: sleep


   RX: Metoprolol Succinate (ER) [Toprol XL] 25 mg PO HS


   RX: Losartan Potassium 100 mg PO DAILY


   RX: hydroCHLOROthiazide 25 mg PO DAILY


   RX: Aspirin [Adult Low Dose Aspirin EC] 81 mg PO DAILY


   Cholecalciferol [Vitamin D3 (125 Mcg = 5000 Iu)] 125 mcg PO HS


   amLODIPine [Norvasc] 5 mg PO HS


   Calcium/Magnesium/Zinc [Calcium-Magnesium-Zinc Tablet] 2 tab PO DAILY


   Atorvastatin [Lipitor] 40 mg PO HS


   diphenhydrAMINE [Benadryl] 50 mg PO Q6H PRN


     PRN Reason: poison ivy itching


   Turmeric Root Extract [Turmeric] 500 mg PO DAILY


Discharge Medication List





RX: Melatonin 10 mg PO HS PRN 12/09/14 [History]


RX: Aspirin [Adult Low Dose Aspirin EC] 81 mg PO DAILY 03/03/20 [History]


RX: Losartan Potassium 100 mg PO DAILY 03/03/20 [History]


RX: Metoprolol Succinate (ER) [Toprol XL] 25 mg PO HS 03/03/20 [History]


RX: hydroCHLOROthiazide 25 mg PO DAILY 03/03/20 [History]


Atorvastatin [Lipitor] 40 mg PO HS 12/10/21 [History]


Calcium/Magnesium/Zinc [Calcium-Magnesium-Zinc Tablet] 2 tab PO DAILY 12/10/21 

[History]


Cholecalciferol [Vitamin D3 (125 Mcg = 5000 Iu)] 125 mcg PO HS 12/10/21 

[History]


amLODIPine [Norvasc] 5 mg PO HS 12/10/21 [History]


Turmeric Root Extract [Turmeric] 500 mg PO DAILY 06/17/22 [History]


diphenhydrAMINE [Benadryl] 50 mg PO Q6H PRN 06/17/22 [History]


Cyclobenzaprine [Flexeril] 5 mg PO TID #30 tablet 06/22/22 [Rx]


Docusate [Colace] 100 mg PO DAILY #30 capsule 06/22/22 [Rx]


Enoxaparin [Lovenox] 40 mg SQ DAILY #28 each 06/22/22 [Rx]


HYDROcodone/APAP 5-325MG [Norco 5-325] 1 - 2 tab PO Q6HR PRN #27 tab 06/22/22 

[Rx]








Follow up Appointment(s)/Referral(s): 


Paris Ramirez MD [Primary Care Provider] - 1-2 days


Gerson Li DO [Doctor of Osteopathic Medicine] - 2 Weeks


Patient Instructions/Handouts:  Intramedullary Nailing (DC)


Activity/Diet/Wound Care/Special Instructions: 


Discharge instructions:


1.  Wound care and infection precautions, keep incision dry and covered while 

showering, no lotions, creams, moisturizers. No soaking, tubs, pools, hottubs. 

Do not scrub over the incision.


2.  50% Weight-bearing right lower extremity with walker until follow-up.


3.  Ice and elevate when necessary.  Do not exceed 20 minutes per hour with ice 

pack.


4.  Utilize compression sleeve until seen at first follow up appointment.


5.  Visiting nursing care.


6.  Physical therapy


7.  Pain meds and anticoagulants per prescription.


8.  Pain medication has potential to cause constipation. Increase oral fluid and

fiber intake. Contact primary care provider if you have not had a bowel movement

within 48 hours after discharge


9.  No anti-inflammatory medication until discussed at first post operative 

visit, this including Motrin, Aleve, Mobic, Diclofenac.


10.  Follow up in office at 2 weeks postop with Dr. Gerson Li


11. Follow up with your primary care doctor 7-10 days after discharge.


12. Contact [facility] with any questions, [phone].





Keep silver foam dressings on until Monday, 06/27/2022. Dressings may be removed

on 6/27/2022. Try to keep incisions clean, dry. 





[facility] has been referred for your home care 

services. They are located at [address] [place] [address] Dr. Mahmood [address] [place] [address]. 

They can be reaches at [phone]


Discharge Disposition: TRANSFER TO SNF/ECF

## 2022-06-22 NOTE — P.PN
Subjective


Progress Note Date: 06/22/22


Principal diagnosis: 





right femur fracture





Pt se this AM.  Doing OK.  She has been on commode and had an accident in bed 

but this was due to not getting up quick enough.  She would like to shower today

and I think this is reasonable. She can shower.  She has been up in chair and up

and about with walker but not able to bear much weight on her leg.  She c/o pain

in her leg.  No numbness/tingling No other sx.  No f/c/sob/cp at this time. 





Objective





- Vital Signs


Vital signs: 


                                   Vital Signs











Temp  98.4 F   06/22/22 07:00


 


Pulse  89   06/22/22 07:00


 


Resp  17   06/22/22 07:00


 


BP  152/73   06/22/22 07:00


 


Pulse Ox  94 L  06/22/22 07:00


 


FiO2      








                                 Intake & Output











 06/21/22 06/22/22 06/22/22





 18:59 06:59 18:59


 


Output Total 825 800 300


 


Balance -825 -800 -300


 


Output:   


 


  Urine 825 800 300














- Exam


Exam repeated today. No significant changes.  Compartments soft and 

comporessive.  Increased bruising around surgical sites. No drainage. 5/5 

strength 2/4 pulses NV intact. 


Alert and oriented 3 appears well-nourished well-hydrated is no acute distress.

 Does not appear septic





Right lower extremity





Dressings clean and dry


Some swelling about the right lower extremity and knee and thigh the much better

some bruising noted as well


Mild tenderness to palpation around the incision sites


Compartments soft and compressive


2 out of 4 DP PT pulses distally


Sensation intact L2 S1


5 out of 5 dorsiflexion plantar flexion EHL FHL








- Labs


CBC & Chem 7: 


                                 06/22/22 05:39





                                 06/20/22 04:20


Labs: 


                  Abnormal Lab Results - Last 24 Hours (Table)











  06/21/22 Range/Units





  13:04 


 


WBC  11.18 H  (4.50-10.00)  X 10*3/uL


 


RBC  2.38 L  (4.10-5.20)  X 10*6/uL


 


Hgb  7.9 L  (12.0-15.0)  g/dL


 


Hct  23.7 L  (37.2-46.3)  %


 


MCV  99.6 H  (80.0-97.0)  fL


 


MCH  33.2 H  (27.0-32.0)  pg


 


Absolute Nucleated RBC  0.03 H  (0.00-0.00)  X 10*3/uL


 


Immature Gran #  0.08 H  (0.00-0.04)  X 10*3/uL


 


Neutrophils #  8.16 H  (1.80-7.70)  X 10*3/uL


 


NRBC/100 WBC Diff  0.3 H  (0.0-0.0)  /100 WBCS














Assessment and Plan


Assessment: 


Postop day 3 ORIF right subtrochanteric fracture with intramedullary nail 

fixation





76 yo female s/p FFS





Right subtrochanteric hip fracture, displaced, rotated, comminuted, closed





Hx MI with CA








Plan: 


-Appreciate consultant and team management.  





-Activity: Ambulate QID, OOB all meals, Use walker or cane if needed for 

stability.  





   -Daily PT/OT, increase ambulation strength and balance. 


   -Assistance with ambulation


   -50% WB RLE at this time with assist





-Pain control: Adequate at this time





-Meds: reviewed





-GI ppx: senna, Miralax





-DC cruz when up and about, bedside commode if needed





-DVT PPX: Lovenox 4 weeks postop





-Hygiene: Shower today.  Maintain dressing clean and dry.  Meticulous cleaning 

after BMs away from incision site





-Encourage IS 10x/hr





-Dispo: G[facility]ER when available orthopedically stable

## 2022-06-22 NOTE — P.PN
Subjective


Progress Note Date: 06/22/22


Principal diagnosis: 


right hip subtrochanteric fracture status post fall





patient was seen up in chair this morning with ice on leg and eating breakfast. 

patient says she did get up with the physical therapy yesterday and was able to 

stand up at bedside with assistance and a walker and was able to bear some 

weight on the right lower extremity.  Patient says she was able to walk to the 

chair and sat in the chair for several hours.  Patient says she has been pumping

her ankle 10 times each hour as well as using incentive spirometer.  Patient 

says she has not had bowel movement yet, however, patient says she has been 

passing gas.  Patient denies chest pain, fever, shortness of breath, nausea, 

vomiting, change in vision, loss of bowel/bladder control.








Objective





- Vital Signs


Vital signs: 


                                   Vital Signs











Temp  98.4 F   06/22/22 07:00


 


Pulse  89   06/22/22 07:00


 


Resp  17   06/22/22 07:00


 


BP  152/73   06/22/22 07:00


 


Pulse Ox  94 L  06/22/22 07:00


 


FiO2      








                                 Intake & Output











 06/21/22 06/22/22 06/22/22





 18:59 06:59 18:59


 


Output Total 825 800 300


 


Balance -825 -800 -300


 


Output:   


 


  Urine 825 800 300














- Exam


Optifoam dressings present on right lateral hip/upper leg currently. Minimal 

drainage at this time.  Patient has moderate tenderness to palpation along the 

right lateral hip near incisions.  Sensation is equal, symmetric, bilateral 

intact throughout the upper and lower extremities.  Patient does have limited 

range of motion in right hip flexion/extension as well as knee flexion/extension

due to pain.  Patient has full range of motion in bilateral dorsi/plantar 

flexion of the ankles.  Patient is able wiggle digits in feet.  Patient has full

range of motion bilateral upper extremities as well as left lower extremity.  

4+/5 strength in bilateral upper extremities and left lower extremity.  3+/5 and

right lower extremity in resisted hip flexion/extension and knee 

flexion/extension.  Neurovascular status is intact bilaterally.  Cap refill 

under 3 seconds in digits of upper extremities.  Radial pulse intact, 

bilaterally, 2+. DP pulses intact bilaterally.  Negative Homans bilaterally.








- Labs


CBC & Chem 7: 


                                 06/22/22 05:39





                                 06/20/22 04:20


Labs: 


                  Abnormal Lab Results - Last 24 Hours (Table)











  06/21/22 Range/Units





  13:04 


 


WBC  11.18 H  (4.50-10.00)  X 10*3/uL


 


RBC  2.38 L  (4.10-5.20)  X 10*6/uL


 


Hgb  7.9 L  (12.0-15.0)  g/dL


 


Hct  23.7 L  (37.2-46.3)  %


 


MCV  99.6 H  (80.0-97.0)  fL


 


MCH  33.2 H  (27.0-32.0)  pg


 


Absolute Nucleated RBC  0.03 H  (0.00-0.00)  X 10*3/uL


 


Immature Gran #  0.08 H  (0.00-0.04)  X 10*3/uL


 


Neutrophils #  8.16 H  (1.80-7.70)  X 10*3/uL


 


NRBC/100 WBC Diff  0.3 H  (0.0-0.0)  /100 WBCS














Assessment and Plan


Assessment: 


1. right subtrochanteric hip fracture





- Postoperative day #3 status post right hip IM nail





Plan: 


1. right subtrochanteric hip fracture - right hip IM nail surgery performed 

Sunday, 06/19/2022.  Patient stable at bedside this morning.  Surgical dressings

are intact at this time with minimal drainage. Surgical dressings to be changed 

prior to discharge to [facility].  Patient did work with therapy yesterday and was able

to stand up at bedside and bear weight on the right lower extremity.  Continue 

with pain meds. We'll continue to follow patient while in hospital.  Plan for 

discharge to [facility] today, Wednesday, 6/22/2022.





2.  Appreciate medical management





3.  DVT prophylaxis - Lovenox





4.  Pain management - Tylenol; Norco; Flexeril; IV meds as necessary





5.  GI prophylaxis - senna





6.  PT/OT - 50% weightbearing on RLE with walker/assistance





7.  Encourage incentive spirometer use





8.  Discharge planning - Plan for discharge to [facility] today, Wednesday, 6/22/2022.





Time with Patient: Less than 30

## 2022-06-22 NOTE — P.PN
Subjective


Progress Note Date: 06/21/22











Amanda Pruitt,  is a 77-year-old female who presented to [facility] emergency room after sustaining a fall at home, patient stated that she

fell in her basement on the concrete floor and was having pain in the right 

thigh area she was unable to ambulate, she denies any other area of pain , she 

denies any head trauma when she fell there was no loss of consciousness , EMS 

were called and she was brought into emergency room.


She was evaluated in the emergency room vital examination on presentation 

revealed a temperature of 97.3 pulse 61 respirations 16 blood pressure 158/84 

pulse ox 98% on room air


Laboratory data revealed a white blood count of 13.0 hemoglobin 13.1 platelet 

count 266 BUN 19 creatinine 0.74 urine analysis did not reveal any evidence of 

urinary tract infection


Testing in the emergency room revealed x-ray of the right femur revealed 

comminuted oblique fracture in the proximal shaft and subtrochanteric region of 

the right femur, chest x-ray revealed marked cardiomegaly no acute process.  EKG

was done in the emergency room and revealed sinus rhythm with first-degree AV 

block and right bundle branch block and T-wave abnormality in the inferior leads


Patient was admitted to medical floor for further evaluation and treatment.


Past medical history is significant for history of hypertension, history of 

hyperlipidemia, history of coronary artery disease with history of myocardial 

infarction with cardiac arrest in 2019, patient had angioplasty with 2 stent 

placement at that time in [place], she states that she has been following with 

cardiology since then and had no further cardiac issues.  Past medical history 

is also significant for history of breast cancer, with history of left breast 

lumpectomy, and history of osteoarthritis with history of left total knee 

arthroplasty.





On 06/19/2022 patient was seen and examined on the medical floor she is alert 

and oriented 3 in no apparent distress there is no fever or chills no headache 

or dizziness no chest pain no shortness of breath no cough no nausea or vomiting

no abdominal pain no diarrhea no urinary symptoms, labs are still pending for 

today, patient is scheduled for surgery this morning





On 06/20/2022 patient was seen and examined on the medical floor she is alert 

and oriented 3 in no apparent distress, she is complaining of pain in the lower

extremity, she is also complaining of constipation otherwise she denies any 

complaints there is no fever or chills no headache or dizziness no chest pain no

shortness of breath no cough no nausea or vomiting no abdominal pain no diarrhea

and no urinary symptoms, hemoglobin today is down to 7.9 will continue to 

monitor





On 06/21/2022 patient is alert and oriented 3.  Patient complaining of 

generalized lower extremity weakness and pain.  Discharge planning is in place. 

Hemoglobin 7.9.  At this time patient denies chest pain or shortness of breath. 

Patient denies nausea vomiting or diarrhea.  Patient denies any urinary burning 

or frequency





Objective





- Vital Signs


Vital signs: 


                                   Vital Signs











Temp  98.2 F   06/21/22 15:00


 


Pulse  77   06/21/22 15:00


 


Resp  16   06/21/22 02:00


 


BP  121/73   06/21/22 15:00


 


Pulse Ox  98   06/21/22 15:00


 


FiO2      








                                 Intake & Output











 06/20/22 06/21/22 06/21/22





 18:59 06:59 18:59


 


Output Total 800 900 


 


Balance -800 -900 


 


Output:   


 


  Urine 800 900 


 


    Uretheral (Henriquez) 800  


 


Other:   


 


  Voiding Method Indwelling Catheter  


 


  # Voids  1 


 


  # Bowel Movements  1 














- Exam








In general patient is alert and oriented x 3 in no distress


HEENT head normocephalic and atraumatic


Neck is supple no JVD no goiter no lymphadenopathy no carotid bruit


Chest examination is clear to auscultation no crackles no wheezing


Cardiac exam reveals regular heart sounds S1 and S2 no gallops no murmurs


Abdomen is soft nontender no organomegaly with normal bowel sounds


Extremity exam reveals no edema no cyanosis or clubbing


Neurological examination reveals no gross focal deficits





- Labs


CBC & Chem 7: 


                                 06/22/22 05:39





                                 06/20/22 04:20





Assessment and Plan


Plan: 








Fall with right femur fracture





Underlying history of hypertension





Underlying history of hyperlipidemia





Underlying history of coronary artery disease with history of coronary artery 

angioplasty and stent placement 2 in 2019





Previous history of breast cancer with left lumpectomy








At this time patient was admitted to medical floor


Home medications reviewed and reordered


EKG and chest x-ray reviewed no acute abnormality


Echocardiogram ordered and cardiology consult for clearance requested due to 

history of coronary artery disease


Recheck labs in a.m. Will follow closely

## 2022-06-22 NOTE — P.PN
Subjective


Progress Note Date: 06/22/22











Amanda Pruitt,  is a 77-year-old female who presented to [facility] emergency room after sustaining a fall at home, patient stated that she

fell in her basement on the concrete floor and was having pain in the right 

thigh area she was unable to ambulate, she denies any other area of pain , she 

denies any head trauma when she fell there was no loss of consciousness , EMS 

were called and she was brought into emergency room.


She was evaluated in the emergency room vital examination on presentation 

revealed a temperature of 97.3 pulse 61 respirations 16 blood pressure 158/84 

pulse ox 98% on room air


Laboratory data revealed a white blood count of 13.0 hemoglobin 13.1 platelet 

count 266 BUN 19 creatinine 0.74 urine analysis did not reveal any evidence of 

urinary tract infection


Testing in the emergency room revealed x-ray of the right femur revealed 

comminuted oblique fracture in the proximal shaft and subtrochanteric region of 

the right femur, chest x-ray revealed marked cardiomegaly no acute process.  EKG

was done in the emergency room and revealed sinus rhythm with first-degree AV 

block and right bundle branch block and T-wave abnormality in the inferior leads


Patient was admitted to medical floor for further evaluation and treatment.


Past medical history is significant for history of hypertension, history of 

hyperlipidemia, history of coronary artery disease with history of myocardial 

infarction with cardiac arrest in 2019, patient had angioplasty with 2 stent 

placement at that time in [place], she states that she has been following with 

cardiology since then and had no further cardiac issues.  Past medical history 

is also significant for history of breast cancer, with history of left breast 

lumpectomy, and history of osteoarthritis with history of left total knee 

arthroplasty.





On 06/19/2022 patient was seen and examined on the medical floor she is alert 

and oriented 3 in no apparent distress there is no fever or chills no headache 

or dizziness no chest pain no shortness of breath no cough no nausea or vomiting

no abdominal pain no diarrhea no urinary symptoms, labs are still pending for 

today, patient is scheduled for surgery this morning





On 06/20/2022 patient was seen and examined on the medical floor she is alert 

and oriented 3 in no apparent distress, she is complaining of pain in the lower

extremity, she is also complaining of constipation otherwise she denies any 

complaints there is no fever or chills no headache or dizziness no chest pain no

shortness of breath no cough no nausea or vomiting no abdominal pain no diarrhea

and no urinary symptoms, hemoglobin today is down to 7.9 will continue to 

monitor





On 06/21/2022 patient is alert and oriented 3.  Patient complaining of 

generalized lower extremity weakness and pain.  Discharge planning is in place. 

Hemoglobin 7.9.  At this time patient denies chest pain or shortness of breath. 

Patient denies nausea vomiting or diarrhea.  Patient denies any urinary burning 

or frequency





On 06/22/2022 patient is alert and oriented 3.  Hemoglobin improving to 8.2.  

[facility].  We'll give 1 dose of IV iron 

prior to discharge.  Current vital signs temp 98.2, heart rate 79, blood 

pressure 121/73 and patient satting 98% on room air





Objective





- Vital Signs


Vital signs: 


                                   Vital Signs











Temp  98.4 F   06/22/22 07:00


 


Pulse  89   06/22/22 07:00


 


Resp  17   06/22/22 07:00


 


BP  152/73   06/22/22 07:00


 


Pulse Ox  94 L  06/22/22 07:00


 


FiO2      








                                 Intake & Output











 06/21/22 06/22/22 06/22/22





 18:59 06:59 18:59


 


Output Total 825 800 300


 


Balance -825 -800 -300


 


Output:   


 


  Urine 825 800 300














- Exam








In general patient is alert and oriented x 3 in no distress


HEENT head normocephalic and atraumatic


Neck is supple no JVD no goiter no lymphadenopathy no carotid bruit


Chest examination is clear to auscultation no crackles no wheezing


Cardiac exam reveals regular heart sounds S1 and S2 no gallops no murmurs


Abdomen is soft nontender no organomegaly with normal bowel sounds


Extremity exam reveals no edema no cyanosis or clubbing


Neurological examination reveals no gross focal deficits





- Labs


CBC & Chem 7: 


                                 06/22/22 05:39





                                 06/20/22 04:20


Labs: 


                  Abnormal Lab Results - Last 24 Hours (Table)











  06/21/22 06/22/22 Range/Units





  13:04 05:39 


 


WBC  11.18 H   (4.50-10.00)  X 10*3/uL


 


RBC  2.38 L  2.50 L  (4.10-5.20)  X 10*6/uL


 


Hgb  7.9 L  8.2 L  (12.0-15.0)  g/dL


 


Hct  23.7 L  24.4 L  (37.2-46.3)  %


 


MCV  99.6 H  97.6 H  (80.0-97.0)  fL


 


MCH  33.2 H  32.8 H  (27.0-32.0)  pg


 


Absolute Nucleated RBC  0.03 H  0.05 H  (0.00-0.00)  X 10*3/uL


 


Immature Gran #  0.08 H  0.08 H  (0.00-0.04)  X 10*3/uL


 


Neutrophils #  8.16 H   (1.80-7.70)  X 10*3/uL


 


Eosinophils #   0.45 H  (0.04-0.35)  X 10*3/uL


 


NRBC/100 WBC Diff  0.3 H  0.6 H  (0.0-0.0)  /100 WBCS














Assessment and Plan


Plan: 








Fall with right femur fracture.  Status post right hip IM nail on 06/19/2022





Underlying history of hypertension





Underlying history of hyperlipidemia





Underlying history of coronary artery disease with history of coronary artery a

ngioplasty and stent placement 2 in 2019





Previous history of breast cancer with left lumpectomy





Anemia acute blood loss anemia secondary to surgery expected.  1 dose of IV iron

ordered prior to discharge hemoglobin 8.2





At this time patient was admitted to medical floor


Home medications reviewed and reordered


Patient was evaluated by cardiology services prior to surgery


Plans for discharge per orthopedic services today 06/22/2022

## 2022-06-22 NOTE — P.OP
Date of Procedure: 06/19/22


Preoperative Diagnosis: 


1. Right subtrochanteric femur fracture, displaced, rotated, shorted, closed 4 

part





2. s/p FFS





3. Complex medical patient


Postoperative Diagnosis: 


1. Right subtrochanteric femur fracture, displaced, rotated, shorted, closed 4 

part





2. s/p FFS





3. Complex medical patient


Procedure(s) Performed: 


1. Open reduction with internal fixation and intramedullary nail fixation of 

right subtrochanteric femur fracture





2. Interpretation of intraoperative flouroscopy <1hr


Implants: 


S&N 11.5 x 420 x 130 deg Long IMN





85/80 lag/compression screw





35 distal locking screw





3 cables


Anesthesia: MAC, spinal


Surgeon: Gerson Li


Assistant #1: Kavon Carrillo (Was present and assisted in all aspects of the case)


Estimated Blood Loss (ml): 400


IV fluids (ml): 2,000


Urine output (ml): 350


Pathology: none sent


Condition: stable


Disposition: PACU


Indications for Procedure: 


76 yo female sustained a ffs at home in her basement onto concrete.  She was 

brought to the hospital by EMS and found to have a displaced, comminuted, closed

subtrochanteric fracture on the right.  She was admitted to the hospital and 

planned on fixing fracture.  She needed further work up due to a hx of MI with 

cardiac arrest in 2018 and so this was performed for clearance for surgical 

intervention given her elevated risk and the extent of the fracture.  She was 

finally cleared.  We discussed options for treatment with the patient and her 

family and they understand the risks, benefits and limitation of surgery as well

as anesthesia as outlined in the risk review.  They were willing to proceed with

surgical fixation of this fracture.  


Description of Procedure: 


The patient was seen and examined in the preoperative area.  All preoperative 

protocols were followed.  Informed consent was obtained risks and benefits of 

the procedure were discussed at length.  Risks including bleeding infection 

damage to the surrounding tissue and risk of reoperation were discussed with the

patient.  Risk of anesthesia up to and including death was a discussed with the 

patient.  These are outlined in the risk reviewed.  They were willing to accept 

these risks and all of the risks of surgery.  The patient was given a weight-

based dose of antibiotics in the form of 2 g Ancef.  The patient was seen and 

evaluated by the anesthesia team who deemed them fit for surgery. The site was 

marked, the patient was willing to proceed with the procedure. 





 The patient was transferred to the operative suite by the Department of 

anesthesia.  There were then drifted off to sleep by the department of 

anesthesia and spinal with sedation anesthesia was used.  Once adequate 

anesthesia had been obtained the patient was carefully transferred to the 

operative bed.  All bony prominences were padded accordingly.  SCDs were placed 

on the nonoperative lower extremities.  Arms were well padded.  





Once on the Randi bed the post was placed the patient's right lower extremity 

was placed in a boot secured and then secured to the table her left leg was 

placed in a well-leg tineo well padded and secured.  She was then secured to 

the table safety strap as well.  Imaging was taken to confirm the fracture 

alignment on the table. 





Preoperative briefing was done with the operative team and everyone was ready 

for the procedure to start.  The patients right leg was then prepped and draped

in the normal sterile fashion.  Timeout was then performed and all parties in 

agreement with the procedure to be performed. 





Fluoroscopic imaging was used to identify the fracture site and marked it with a

skin marker.  Skin incision was then made over the lateral aspect patient's 

thigh dissection taken down to the tensor fascia which was identified and split 

in line with its fibers.  This revealed the vastus lateralis.  We then searched 

the insertion of the intermuscular septum posteriorly and found this and we will

to relieve the vastus lateralis from this.  As allowed us access to the femur.  

Retractors were placed in order to visualize the fracture fragments.  The 

fracture was then identified and cleaned using a Villegas as well as a key elevator.

 Irrigation was then performed of the fracture site.  We then using a 

combination of Randi table distraction techniques as well as rotation techniques

placed 3 cables around the fracture fragments.  Then using a key elevator were 

able to reduce the fracture along with manual pressure there was a large 

butterfly fragment posteriorly as well as a second butterfly fragment medially 

we were able to capture these provisionally with the wires.  Once they were 

captured with the wires the wires were provisionally tightened which allowed 

maintenance of fracture reduction we confirmed this on AP and lateral 

fluoroscopic imaging.  We then tightened the wires proximally and distally and 

cinched them and then they were final tightened and the position.  This allowed 

for maintenance of fracture reduction at the site.  We then made skin incision 

to centers proximal to the greater trochanter and blunt dissection taken down to

the tip of the greater trochanter which was palpated.  All was then used to 

enter in a standard intramedullary nail approach just medial to the greater 

trochanter and centered within the neck and head of the femur.  This was 

confirmed on AP and lateral fluoroscopic imaging.  Once the awl was in position 

we then placed a guidewire the awl was then removed and over the guidewire and 

opening reamer was placed once this was accomplished we then passed a ball-

tipped guidewire through this area past the fracture segment taking care to 

ensure within bone as we passed the wire with fluoroscopic imaging.  Once the 

wire was in position distally we took images at the knee and AP and lateral to 

confirm that the wire was centered within the bone and also in good position.  

We then took a measurement off of this wire and selected the nail that was of 

appropriate size 11.5 mm x 420 130 neck.  We then sequentially reamed the 

femoral canal using reamers and fluoroscopic imaging.  We reamed to 1-1/2 above 

the nail size.  Once reaming was complete we then passed the nail over the 

guidewire and impacted it in position.  Once it was in position we did a picture

at the knee as well as the hip to ensure that it was in good position just above

the superior pole patella and riding low within the calcar of the femoral neck. 

We then placed the jig for the lag screw.  Wire was drilled through the jig for 

lag screw and confirmed to be center center within the head and neck on the 

lateral as well as the AP.  Once we confirmed this position we ALSO confirmed 

that it was within 10 mm of subchondral bone on AP and lateral imaging.  We then

measured off of this and selected a lag screw as well as a compression screw 

based off of this.  We then drilled for the compression screw to the appropriate

length followed by drilling for the leg screw.  Lag screw was then placed over 

the guidewire and secured into position the compression screw was then placed 

and no compression was performed as it was not needed.  The proximal portion of 

the nail was then locked and confirmed to be in position.  We then turned our at

tention distally to the distal locking screw in the static hole and obtained 

perfect circles stab incision was then made blunt dissection taken down of the 

femur we then drilled through this and confirmed to be through the nail we then 

measured and under AP imaging placed the screw through and through.  Good 

purchase was obtained AP and lateral of the knee after this confirm the screw 

being good position and through the nail.  Once we confirm this we then removed 

the jig from the nail and took final fluoroscopic imaging confirming maintenance

of reduction and good reduction of fracture as well as good placement of nail.  

We then final tightened the last wire into position and cut the excess wire.  

Wounds were then copiously irrigated with 3 L normal sterile saline with and 

closed the tensor fascia with #1 Vicryl in a running locking fashion to close 

the proximal fascia with 0 Vicryl we closed the deep subcu tissue with 0 Vicryl 

the superficial subcu tissue with 2-0 Vicryl and the skin with skin staples.  

Wound edges approximated very well.  The wound was then cleaned and dressed 

sterilely with an operative foam dressings.





 


The patient was then transferred back to their hospital bed.  There were 

awakened by department of anesthesia having tolerated the procedure very well 

with no complications.  The patient was then transported to the postoperative 

care unit in stable condition.

## 2023-04-19 ENCOUNTER — HOSPITAL ENCOUNTER (OUTPATIENT)
Dept: HOSPITAL 47 - PROCWHC3 | Age: 78
End: 2023-04-19
Attending: REGISTERED NURSE
Payer: MEDICARE

## 2023-04-19 VITALS
HEART RATE: 60 BPM | TEMPERATURE: 97.3 F | DIASTOLIC BLOOD PRESSURE: 77 MMHG | RESPIRATION RATE: 16 BRPM | SYSTOLIC BLOOD PRESSURE: 138 MMHG

## 2023-04-19 DIAGNOSIS — M81.0: Primary | ICD-10-CM

## 2023-04-19 DIAGNOSIS — Z87.891: ICD-10-CM

## 2023-04-19 PROCEDURE — 96365 THER/PROPH/DIAG IV INF INIT: CPT
